# Patient Record
Sex: FEMALE | Race: OTHER | HISPANIC OR LATINO
[De-identification: names, ages, dates, MRNs, and addresses within clinical notes are randomized per-mention and may not be internally consistent; named-entity substitution may affect disease eponyms.]

---

## 2017-02-28 ENCOUNTER — APPOINTMENT (OUTPATIENT)
Dept: RADIOLOGY | Facility: IMAGING CENTER | Age: 67
End: 2017-02-28

## 2017-02-28 ENCOUNTER — OUTPATIENT (OUTPATIENT)
Dept: OUTPATIENT SERVICES | Facility: HOSPITAL | Age: 67
LOS: 1 days | End: 2017-02-28
Payer: COMMERCIAL

## 2017-02-28 ENCOUNTER — APPOINTMENT (OUTPATIENT)
Dept: MAMMOGRAPHY | Facility: IMAGING CENTER | Age: 67
End: 2017-02-28

## 2017-02-28 DIAGNOSIS — Z12.31 ENCOUNTER FOR SCREENING MAMMOGRAM FOR MALIGNANT NEOPLASM OF BREAST: ICD-10-CM

## 2017-02-28 DIAGNOSIS — Z13.820 ENCOUNTER FOR SCREENING FOR OSTEOPOROSIS: ICD-10-CM

## 2017-02-28 PROCEDURE — 77080 DXA BONE DENSITY AXIAL: CPT

## 2017-02-28 PROCEDURE — 77063 BREAST TOMOSYNTHESIS BI: CPT

## 2017-02-28 PROCEDURE — 77067 SCR MAMMO BI INCL CAD: CPT

## 2017-07-06 ENCOUNTER — RESULT REVIEW (OUTPATIENT)
Age: 67
End: 2017-07-06

## 2020-03-10 ENCOUNTER — APPOINTMENT (OUTPATIENT)
Dept: THORACIC SURGERY | Facility: CLINIC | Age: 70
End: 2020-03-10
Payer: COMMERCIAL

## 2020-03-10 VITALS
RESPIRATION RATE: 17 BRPM | SYSTOLIC BLOOD PRESSURE: 118 MMHG | BODY MASS INDEX: 26.13 KG/M2 | TEMPERATURE: 98.3 F | OXYGEN SATURATION: 97 % | HEART RATE: 100 BPM | WEIGHT: 142 LBS | HEIGHT: 62 IN | DIASTOLIC BLOOD PRESSURE: 78 MMHG

## 2020-03-10 PROCEDURE — 99205 OFFICE O/P NEW HI 60 MIN: CPT

## 2020-03-10 RX ORDER — CHLORHEXIDINE GLUCONATE 4 %
400 LIQUID (ML) TOPICAL
Refills: 0 | Status: ACTIVE | COMMUNITY

## 2020-03-10 RX ORDER — CETIRIZINE HCL 10 MG
10 TABLET ORAL
Refills: 0 | Status: ACTIVE | COMMUNITY

## 2020-03-10 RX ORDER — MELOXICAM 15 MG/1
15 TABLET ORAL
Refills: 0 | Status: ACTIVE | COMMUNITY

## 2020-03-10 RX ORDER — CLONAZEPAM 0.5 MG/1
0.5 TABLET ORAL
Refills: 0 | Status: ACTIVE | COMMUNITY

## 2020-03-13 NOTE — PHYSICAL EXAM
[General Appearance - Alert] : alert [General Appearance - In No Acute Distress] : in no acute distress [Sclera] : the sclera and conjunctiva were normal [PERRL With Normal Accommodation] : pupils were equal in size, round, and reactive to light [Extraocular Movements] : extraocular movements were intact [Outer Ear] : the ears and nose were normal in appearance [Oropharynx] : the oropharynx was normal [Neck Appearance] : the appearance of the neck was normal [Neck Cervical Mass (___cm)] : no neck mass was observed [Jugular Venous Distention Increased] : there was no jugular-venous distention [Thyroid Diffuse Enlargement] : the thyroid was not enlarged [Thyroid Nodule] : there were no palpable thyroid nodules [Auscultation Breath Sounds / Voice Sounds] : lungs were clear to auscultation bilaterally [Heart Rate And Rhythm] : heart rate was normal and rhythm regular [Heart Sounds] : normal S1 and S2 [Heart Sounds Gallop] : no gallops [Murmurs] : no murmurs [Heart Sounds Pericardial Friction Rub] : no pericardial rub [Examination Of The Chest] : the chest was normal in appearance [Chest Visual Inspection Thoracic Asymmetry] : no chest asymmetry [Diminished Respiratory Excursion] : normal chest expansion [2+] : left 2+ [No Abnormalities] : the abdominal aorta was not enlarged and no bruit was heard [Breast Appearance] : normal in appearance [Breast Palpation Mass] : no palpable masses [Bowel Sounds] : normal bowel sounds [Abdomen Soft] : soft [Abdomen Tenderness] : non-tender [Abdomen Mass (___ Cm)] : no abdominal mass palpated [Cervical Lymph Nodes Enlarged Posterior Bilaterally] : posterior cervical [Cervical Lymph Nodes Enlarged Anterior Bilaterally] : anterior cervical [Supraclavicular Lymph Nodes Enlarged Bilaterally] : supraclavicular [No CVA Tenderness] : no ~M costovertebral angle tenderness [No Spinal Tenderness] : no spinal tenderness [Skin Color & Pigmentation] : normal skin color and pigmentation [Skin Turgor] : normal skin turgor [] : no rash [Deep Tendon Reflexes (DTR)] : deep tendon reflexes were 2+ and symmetric [Sensation] : the sensory exam was normal to light touch and pinprick [No Focal Deficits] : no focal deficits [Oriented To Time, Place, And Person] : oriented to person, place, and time [Impaired Insight] : insight and judgment were intact [Affect] : the affect was normal [Right Carotid Bruit] : no bruit heard over the right carotid [Left Carotid Bruit] : no bruit heard over the left carotid [Right Femoral Bruit] : no bruit heard over the right femoral artery [Left Femoral Bruit] : no bruit heard over the left femoral artery [FreeTextEntry1] : Deferred

## 2020-03-13 NOTE — REASON FOR VISIT
[Consultation] : a consultation visit [Family Member] : family member [FreeTextEntry1] : Lung nodules

## 2020-03-13 NOTE — DATA REVIEWED
[FreeTextEntry1] : CT chest on 2020:\par - mild to moderate bilateral subpleural reticulations with mild associated groundglass attenuation and traction bronchiectasis within the lung bases consistent with interstitial lung disease, possibly UIP pattern. No honeycombing. No pleural effusion or pneumothorax\par - subcentimeter pulmonary nodules are noted includin mm RUL nodule (3: 41); 3 mm RML nodule (3: 76); 4 mm JENI nodule (3: 42); 2 mm LLL nodule (3: 108)

## 2020-03-13 NOTE — HISTORY OF PRESENT ILLNESS
[FreeTextEntry1] : Ms. HERNÁN MICHEL, 69 year old female, never smoker, w/ hx of DMII, HTN, HLD, Skin CA, who had bronchitis in 2019 in [place] s/p antibiotic and steroid, symptoms improved after treatment. Patient presented to Dr. Joann Concepcion c/o SOB, fatigue, dry cough. CT revealed lung nodules and interstitial lung disease. \par \par CT chest on 2020:\par - mild to moderate bilateral subpleural reticulations with mild associated groundglass attenuation and traction bronchiectasis within the lung bases consistent with interstitial lung disease, possibly UIP pattern. No honeycombing. No pleural effusion or pneumothorax\par - subcentimeter pulmonary nodules are noted includin mm RUL nodule (3: 41); 3 mm RML nodule (3: 76); 4 mm JENI nodule (3: 42); 2 mm LLL nodule (3: 108)\par \par Patient is here today for CT surgery consultation, self-refer. Patient c/o SOB on exertion, fatigue, dry cough, back pain, 8 lbs weight loss over a month, chills at night, denies chest pain, fever.

## 2020-03-13 NOTE — CONSULT LETTER
[Dear  ___] : Dear  [unfilled], [Consult Letter:] : I had the pleasure of evaluating your patient, [unfilled]. [( Thank you for referring [unfilled] for consultation for _____ )] : Thank you for referring [unfilled] for consultation for [unfilled] [Please see my note below.] : Please see my note below. [Consult Closing:] : Thank you very much for allowing me to participate in the care of this patient.  If you have any questions, please do not hesitate to contact me. [Sincerely,] : Sincerely, [FreeTextEntry2] : Dr. Joann Concepcion (Pulm)\par Dr. Akosua Concepcion (PCP) [FreeTextEntry3] : Jad Richardson MD, FACS \par Chief, Division of Thoracic Surgery \par Director, Minimally Invasive Thoracic Surgery \par Department of Cardiovascular and Thoracic Surgery \par [facility] \par , Cardiovascular and Thoracic Surgery\par \par

## 2020-03-13 NOTE — ASSESSMENT
[FreeTextEntry1] : Ms. HERNÁN MICHEL, 69 year old female, never smoker, w/ hx of DMII, HTN, HLD, Skin CA, who had bronchitis in 2019 in [place] s/p antibiotic and steroid, symptoms improved after treatment. Patient presented to Dr. Joann Concepcion c/o SOB, fatigue, dry cough. CT revealed lung nodules and interstitial lung disease. \par \par CT chest on 2020:\par - mild to moderate bilateral subpleural reticulations with mild associated groundglass attenuation and traction bronchiectasis within the lung bases consistent with interstitial lung disease, possibly UIP pattern. No honeycombing. No pleural effusion or pneumothorax\par - subcentimeter pulmonary nodules are noted includin mm RUL nodule (3: 41); 3 mm RML nodule (3: 76); 4 mm JENI nodule (3: 42); 2 mm LLL nodule (3: 108)\par \par I have reviewed the patient's medical records and diagnostic images at time of this office consultation and have made the following recommendation:\par 1. CT chest reviewed and explained to patient and her family member, I recommended patient to f/u with Dr. Joann Concepcion for interstitial lung disease. \par 2. Patient would like to have a second opinion, Dr. Alex Love (Pulm) information provided. \par 3. RTC as needed. \par \par I personally performed the services described in the documentation, reviewed the documentation recorded by the scribe in my presence and it accurately and completely records my words and actions.\par \par I, Kalani Ceron NP, am scribing for and the presence of KESHIA Kaur, the following sections HISTORY OF PRESENT ILLNESS, PAST MEDICAL/FAMILY/SOCIAL HISTORY; REVIEW OF SYSTEMS; VITAL SIGNS; PHYSICAL EXAM; DISPOSITION.

## 2020-03-17 ENCOUNTER — APPOINTMENT (OUTPATIENT)
Dept: PULMONOLOGY | Facility: CLINIC | Age: 70
End: 2020-03-17
Payer: COMMERCIAL

## 2020-03-17 VITALS
TEMPERATURE: 98.2 F | WEIGHT: 142 LBS | HEIGHT: 62 IN | OXYGEN SATURATION: 98 % | BODY MASS INDEX: 26.13 KG/M2 | HEART RATE: 102 BPM | SYSTOLIC BLOOD PRESSURE: 128 MMHG | RESPIRATION RATE: 16 BRPM | DIASTOLIC BLOOD PRESSURE: 76 MMHG

## 2020-03-17 DIAGNOSIS — M25.569 PAIN IN UNSPECIFIED KNEE: ICD-10-CM

## 2020-03-17 LAB
CK SERPL-CCNC: 60 U/L
RHEUMATOID FACT SER QL: <10 IU/ML

## 2020-03-17 PROCEDURE — 99203 OFFICE O/P NEW LOW 30 MIN: CPT

## 2020-03-17 RX ORDER — FLUTICASONE PROPIONATE 50 UG/1
50 SPRAY, METERED NASAL TWICE DAILY
Qty: 1 | Refills: 3 | Status: DISCONTINUED | COMMUNITY
Start: 2020-03-17 | End: 2020-03-17

## 2020-03-17 NOTE — ASSESSMENT
[FreeTextEntry1] : I reviewed the patient's CAT scan and there is evidence of early interstitial disease. There was no honeycombing. Her lung labs look preserved. Unfortunately we cannot perform a lung function studies or walk test because of COVI-19 pandemic. Blood work for interstitial lung disease was drawn. I prescribed nasal fluticasone for her cough. Hopefully lung functions can be performed in 2-3 weeks with a 6 minute walk test.

## 2020-03-17 NOTE — HISTORY OF PRESENT ILLNESS
[TextBox_4] : po lerma    \par the patient is a 72-year-old female with cough and dyspnea. The patient is Greek speaking from [place] and the  was her daughter. The patient has complained of increasing dyspnea over the last year. She is also complaining of a continuous dry and nonproductive cough she went to her primary care doctor and eventually a CAT scan was done which showed mild to moderate interstitial lung disease the pattern not typical for usual interstitial pneumonitis. The patient has long-standing history of arthritis of her knees and back but she denies any new onset arthritis, skin eruptions or blurry vision. She also denies dysphagia. She has long-standing diabetes and hypertension. She denies any smoking history or childhood pulmonary diseases.

## 2020-03-17 NOTE — REVIEW OF SYSTEMS
[Fatigue] : fatigue [Cough] : cough [SOB on Exertion] : sob on exertion [Arthralgias] : arthralgias [Negative] : Neurologic [TextBox_44] : hypertension

## 2020-03-17 NOTE — PHYSICAL EXAM
[No Acute Distress] : no acute distress [Normal Oropharynx] : normal oropharynx [Normal Appearance] : normal appearance [No Neck Mass] : no neck mass [Normal Rate/Rhythm] : normal rate/rhythm [Normal S1, S2] : normal s1, s2 [No Clubbing] : no clubbing [No Edema] : no edema [Normal Color/ Pigmentation] : normal color/ pigmentation [No Focal Deficits] : no focal deficits [TextBox_68] : crackles both lung bases [TextBox_99] : waddling gait

## 2020-03-18 LAB
ANA SER IF-ACNC: NEGATIVE
CCP AB SER IA-ACNC: <8 UNITS
CENTROMERE IGG SER-ACNC: <0.2 CD:130001892
DSDNA AB SER-ACNC: <12 IU/ML
ENA JO1 AB SER IA-ACNC: <0.2 AL
ENA SCL70 IGG SER IA-ACNC: <0.2 AL
ENA SS-A AB SER IA-ACNC: <0.2 AL
ENA SS-B AB SER IA-ACNC: <0.2 AL
MPO AB + PR3 PNL SER: NORMAL
RF+CCP IGG SER-IMP: NEGATIVE
RIBOSOMAL P AB SER IA-ACNC: <0.2 AL

## 2020-03-19 LAB
ANCA AB SER-IMP: NEGATIVE
C-ANCA SER-ACNC: NEGATIVE
MYELOPEROXIDASE AB SER QL IA: <5 UNITS
MYELOPEROXIDASE CELLS FLD QL: NEGATIVE
P-ANCA TITR SER IF: NEGATIVE

## 2020-03-20 LAB
SMOOTH MUSCLE AB SER QL IF: NORMAL
STREP DNASE B TITR SER: < 95 U/ML

## 2020-03-23 LAB — RNA POLYMERASE III IGG: 3 UNITS

## 2020-03-26 ENCOUNTER — NON-APPOINTMENT (OUTPATIENT)
Age: 70
End: 2020-03-26

## 2020-04-07 ENCOUNTER — APPOINTMENT (OUTPATIENT)
Dept: PULMONOLOGY | Facility: CLINIC | Age: 70
End: 2020-04-07

## 2020-04-08 ENCOUNTER — RX CHANGE (OUTPATIENT)
Age: 70
End: 2020-04-08

## 2020-04-08 RX ORDER — FLUTICASONE PROPIONATE 50 UG/1
50 SPRAY, METERED NASAL TWICE DAILY
Qty: 48 | Refills: 3 | Status: ACTIVE | COMMUNITY
Start: 2020-03-17 | End: 1900-01-01

## 2020-04-28 ENCOUNTER — APPOINTMENT (OUTPATIENT)
Dept: PULMONOLOGY | Facility: CLINIC | Age: 70
End: 2020-04-28

## 2020-06-04 ENCOUNTER — APPOINTMENT (OUTPATIENT)
Dept: ORTHOPEDIC SURGERY | Facility: CLINIC | Age: 70
End: 2020-06-04
Payer: COMMERCIAL

## 2020-06-04 VITALS
DIASTOLIC BLOOD PRESSURE: 83 MMHG | SYSTOLIC BLOOD PRESSURE: 136 MMHG | BODY MASS INDEX: 26.13 KG/M2 | HEART RATE: 97 BPM | WEIGHT: 142 LBS | TEMPERATURE: 98.1 F | HEIGHT: 62 IN

## 2020-06-04 PROCEDURE — 73030 X-RAY EXAM OF SHOULDER: CPT | Mod: RT

## 2020-06-04 PROCEDURE — 99204 OFFICE O/P NEW MOD 45 MIN: CPT | Mod: 25

## 2020-06-04 PROCEDURE — 20610 DRAIN/INJ JOINT/BURSA W/O US: CPT | Mod: RT

## 2020-08-27 DIAGNOSIS — Z01.818 ENCOUNTER FOR OTHER PREPROCEDURAL EXAMINATION: ICD-10-CM

## 2020-08-28 ENCOUNTER — APPOINTMENT (OUTPATIENT)
Dept: PULMONOLOGY | Facility: CLINIC | Age: 70
End: 2020-08-28
Payer: COMMERCIAL

## 2020-08-28 ENCOUNTER — APPOINTMENT (OUTPATIENT)
Dept: DISASTER EMERGENCY | Facility: CLINIC | Age: 70
End: 2020-08-28

## 2020-08-28 PROCEDURE — 94729 DIFFUSING CAPACITY: CPT

## 2020-08-28 PROCEDURE — 94726 PLETHYSMOGRAPHY LUNG VOLUMES: CPT

## 2020-08-28 PROCEDURE — 94010 BREATHING CAPACITY TEST: CPT

## 2020-08-31 ENCOUNTER — APPOINTMENT (OUTPATIENT)
Dept: PULMONOLOGY | Facility: CLINIC | Age: 70
End: 2020-08-31

## 2021-03-02 ENCOUNTER — APPOINTMENT (OUTPATIENT)
Dept: ORTHOPEDIC SURGERY | Facility: CLINIC | Age: 71
End: 2021-03-02
Payer: COMMERCIAL

## 2021-03-02 VITALS — HEIGHT: 62 IN | WEIGHT: 142 LBS | BODY MASS INDEX: 26.13 KG/M2 | TEMPERATURE: 97.6 F

## 2021-03-02 DIAGNOSIS — M75.01 ADHESIVE CAPSULITIS OF RIGHT SHOULDER: ICD-10-CM

## 2021-03-02 DIAGNOSIS — M75.41 IMPINGEMENT SYNDROME OF RIGHT SHOULDER: ICD-10-CM

## 2021-03-02 DIAGNOSIS — M19.90 UNSPECIFIED OSTEOARTHRITIS, UNSPECIFIED SITE: ICD-10-CM

## 2021-03-02 PROCEDURE — 99072 ADDL SUPL MATRL&STAF TM PHE: CPT

## 2021-03-02 PROCEDURE — 99213 OFFICE O/P EST LOW 20 MIN: CPT

## 2021-03-08 ENCOUNTER — APPOINTMENT (OUTPATIENT)
Dept: ORTHOPEDIC SURGERY | Facility: CLINIC | Age: 71
End: 2021-03-08

## 2021-08-18 ENCOUNTER — APPOINTMENT (OUTPATIENT)
Dept: UROLOGY | Facility: CLINIC | Age: 71
End: 2021-08-18
Payer: COMMERCIAL

## 2021-08-18 VITALS
RESPIRATION RATE: 16 BRPM | BODY MASS INDEX: 25.76 KG/M2 | HEART RATE: 96 BPM | OXYGEN SATURATION: 97 % | HEIGHT: 62 IN | SYSTOLIC BLOOD PRESSURE: 126 MMHG | DIASTOLIC BLOOD PRESSURE: 77 MMHG | WEIGHT: 140 LBS

## 2021-08-18 DIAGNOSIS — N95.2 POSTMENOPAUSAL ATROPHIC VAGINITIS: ICD-10-CM

## 2021-08-18 PROCEDURE — 99204 OFFICE O/P NEW MOD 45 MIN: CPT

## 2021-08-18 RX ORDER — MECLIZINE HYDROCHLORIDE 25 MG/1
25 TABLET ORAL
Refills: 0 | Status: ACTIVE | COMMUNITY

## 2021-08-18 RX ORDER — DULAGLUTIDE 1.5 MG/.5ML
1.5 INJECTION, SOLUTION SUBCUTANEOUS
Refills: 0 | Status: ACTIVE | COMMUNITY

## 2021-08-18 RX ORDER — NITROFURANTOIN MACROCRYSTALS 50 MG/1
50 CAPSULE ORAL
Qty: 30 | Refills: 3 | Status: ACTIVE | COMMUNITY
Start: 2021-08-18 | End: 1900-01-01

## 2021-08-18 RX ORDER — CHOLECALCIFEROL (VITAMIN D3) 25 MCG
TABLET ORAL
Refills: 0 | Status: ACTIVE | COMMUNITY

## 2021-08-18 RX ORDER — QUETIAPINE 150 MG/1
150 TABLET, FILM COATED, EXTENDED RELEASE ORAL
Refills: 0 | Status: ACTIVE | COMMUNITY

## 2021-08-18 RX ORDER — PRAMIPEXOLE DIHYDROCHLORIDE 0.25 MG/1
0.25 TABLET ORAL
Refills: 0 | Status: ACTIVE | COMMUNITY

## 2021-08-18 NOTE — REVIEW OF SYSTEMS
[Abdominal Pain] : abdominal pain [Vomiting] : vomiting [Diarrhea] : diarrhea [Heartburn] : heartburn [Genital bacterial infection] : genital bacterial infection [Urine Infection (bladder/kidney)] : bladder/kidney infection [Dizziness] : dizziness [Negative] : Heme/Lymph

## 2021-08-22 PROBLEM — N95.2 VAGINAL ATROPHY: Status: ACTIVE | Noted: 2021-08-18

## 2021-08-22 NOTE — PHYSICAL EXAM
[General Appearance - Well Developed] : well developed [General Appearance - Well Nourished] : well nourished [Normal Appearance] : normal appearance [Well Groomed] : well groomed [General Appearance - In No Acute Distress] : no acute distress [Edema] : no peripheral edema [Respiration, Rhythm And Depth] : normal respiratory rhythm and effort [Exaggerated Use Of Accessory Muscles For Inspiration] : no accessory muscle use [Abdomen Soft] : soft [Abdomen Tenderness] : non-tender [Costovertebral Angle Tenderness] : no ~M costovertebral angle tenderness [Urethral Meatus] : normal urethra [Urinary Bladder Findings] : the bladder was normal on palpation [External Female Genitalia] : normal external genitalia [Normal Station and Gait] : the gait and station were normal for the patient's age [] : no rash [No Focal Deficits] : no focal deficits [Oriented To Time, Place, And Person] : oriented to person, place, and time [Affect] : the affect was normal [Mood] : the mood was normal [Not Anxious] : not anxious [No Palpable Adenopathy] : no palpable adenopathy [FreeTextEntry1] : vaginal atrophy

## 2021-08-22 NOTE — ASSESSMENT
[FreeTextEntry1] : 70 yo F with recurrent UTI\par \par - PVR = 10ml\par - discussed possible etiologies for UTI. Spent extensive period of time discussed behavioral modification including adequate hydration, cutting back on caffeine intake, timed voiding during the day, importance of controlling any diabetes and chronic constipation and how all of these things could potentially increase risk for persistent or recurrent UTI.\par - UA, culture\par - Renal US\par - Discussed pros and cons of nitrofurantoin PRN intercourse. Rx transmitted\par - Discussed estrace for vaginal atrophy. Rx transmitted and side effect profile reviewed

## 2021-08-22 NOTE — HISTORY OF PRESENT ILLNESS
[FreeTextEntry1] : 70 yo Swedish speaking F presents with frequent UTI history\par Per pt, UTI 30 yrs ago requiring hospitalization for 4-5 days\par Since then, has been getting recurrent UTI - 2/year\par Recently had UTI requiring 3 course of abx\par symptoms - chills, nausea, dizziness\par Some mild dysuria but no significant LUTS\par Voids 5-6/day, no nocturia\par No incontinence\par No gross hematuria\par Drinks 3-4 cups of water, 1 cup of coffee daily\par normal bowel movements\par 3 children, '\par LMP = 20 yrs ago\par sometimes sexually active - some dyspareunia, symptoms seem to occur after intercourse

## 2021-08-23 ENCOUNTER — RX CHANGE (OUTPATIENT)
Age: 71
End: 2021-08-23

## 2021-08-23 ENCOUNTER — NON-APPOINTMENT (OUTPATIENT)
Age: 71
End: 2021-08-23

## 2021-08-23 LAB
APPEARANCE: CLEAR
BACTERIA UR CULT: ABNORMAL
BACTERIA: ABNORMAL
BILIRUBIN URINE: NEGATIVE
BLOOD URINE: NEGATIVE
COLOR: NORMAL
GLUCOSE QUALITATIVE U: NEGATIVE
HYALINE CASTS: 0 /LPF
KETONES URINE: NEGATIVE
LEUKOCYTE ESTERASE URINE: ABNORMAL
MICROSCOPIC-UA: NORMAL
NITRITE URINE: POSITIVE
PH URINE: 5.5
PROTEIN URINE: NEGATIVE
RED BLOOD CELLS URINE: 1 /HPF
SPECIFIC GRAVITY URINE: 1.01
SQUAMOUS EPITHELIAL CELLS: 1 /HPF
UROBILINOGEN URINE: NORMAL
WHITE BLOOD CELLS URINE: 8 /HPF

## 2021-08-23 RX ORDER — SULFAMETHOXAZOLE AND TRIMETHOPRIM 800; 160 MG/1; MG/1
800-160 TABLET ORAL TWICE DAILY
Qty: 14 | Refills: 0 | Status: DISCONTINUED | COMMUNITY
Start: 2021-08-23 | End: 2021-08-23

## 2021-08-23 RX ORDER — CEFDINIR 300 MG/1
300 CAPSULE ORAL
Qty: 14 | Refills: 0 | Status: ACTIVE | COMMUNITY
Start: 2021-08-23 | End: 1900-01-01

## 2021-08-24 ENCOUNTER — NON-APPOINTMENT (OUTPATIENT)
Age: 71
End: 2021-08-24

## 2021-08-25 ENCOUNTER — RX CHANGE (OUTPATIENT)
Age: 71
End: 2021-08-25

## 2021-08-25 ENCOUNTER — APPOINTMENT (OUTPATIENT)
Dept: UROLOGY | Facility: CLINIC | Age: 71
End: 2021-08-25
Payer: COMMERCIAL

## 2021-08-25 DIAGNOSIS — N39.0 URINARY TRACT INFECTION, SITE NOT SPECIFIED: ICD-10-CM

## 2021-08-25 PROCEDURE — 76705 ECHO EXAM OF ABDOMEN: CPT

## 2021-08-26 PROBLEM — N39.0 RECURRENT UTI: Status: ACTIVE | Noted: 2021-08-18

## 2021-12-03 ENCOUNTER — APPOINTMENT (OUTPATIENT)
Age: 71
End: 2021-12-03

## 2022-03-29 ENCOUNTER — APPOINTMENT (OUTPATIENT)
Dept: PULMONOLOGY | Facility: CLINIC | Age: 72
End: 2022-03-29
Payer: COMMERCIAL

## 2022-03-29 VITALS
HEIGHT: 62 IN | DIASTOLIC BLOOD PRESSURE: 82 MMHG | SYSTOLIC BLOOD PRESSURE: 127 MMHG | HEART RATE: 82 BPM | WEIGHT: 144 LBS | RESPIRATION RATE: 16 BRPM | TEMPERATURE: 97.8 F | BODY MASS INDEX: 26.5 KG/M2

## 2022-03-29 PROCEDURE — 99214 OFFICE O/P EST MOD 30 MIN: CPT

## 2022-03-29 NOTE — PHYSICAL EXAM
[No Acute Distress] : no acute distress [Normal Oropharynx] : normal oropharynx [Normal Appearance] : normal appearance [No Neck Mass] : no neck mass [Normal Rate/Rhythm] : normal rate/rhythm [Normal S1, S2] : normal s1, s2 [No Resp Distress] : no resp distress [Clear to Auscultation Bilaterally] : clear to auscultation bilaterally [Normal Gait] : normal gait [No Clubbing] : no clubbing [No Edema] : no edema

## 2022-03-30 RX ORDER — DOXYCYCLINE HYCLATE 100 MG/1
100 TABLET ORAL TWICE DAILY
Qty: 10 | Refills: 0 | Status: ACTIVE | COMMUNITY
Start: 2022-03-30 | End: 1900-01-01

## 2022-04-01 NOTE — END OF VISIT
[Time Spent: ___ minutes] : I have spent [unfilled] minutes of time on the encounter. [FreeTextEntry3] : I spent a total of 30 minutes on this patient encounter including face-to-face time, review of imaging from 20/20 and 2022, review of data that she brought with her and a discussion with her daughter on the phone.

## 2022-04-01 NOTE — ASSESSMENT
[FreeTextEntry1] : I reviewed her CAT scans from 20/20 on from 2022 and I reviewed her lung functions that she brought with her. The cough has upper airway cough characteristics. I first tried nasal fluticasone to be used for the next 4 weeks. I've also prescribed doxycycline and I demonstrated to her how to use her albuterol inhaler. She was using it incorrectly. I gave her an AeroChamber. She and her  are flying to [place] tomorrow and I suggested Afrin nasal spray before the flight.\par \par Notes sent to Nicholas Concepcion MD

## 2022-04-01 NOTE — HISTORY OF PRESENT ILLNESS
[TextBox_4] : 71-year-old portugese speaking female with her  and her daughter woke on the phone. I had seen her 2 years ago because of the possibility of progressive pulmonary fibrosis. She reports that she is not any more dyspneic than she was 2 years ago. She brought in copies of lung functions which showed mild restrictive ventilatory impairment with a mild reduction in her diffusing capacity and she brought in with her CAT scan CD demonstrating stable pulmonary fibrosis. Her major complaint is cough that is present day and night and not triggered by anything. Except perhaps physical activity. She denies any increase in cough with positional changes, time of day, eating or drinking. She is hypertensive but she is not on an ACE inhibitor. She is a nonsmoker without occupational exposures. She denies any reflux symptoms

## 2022-04-08 ENCOUNTER — APPOINTMENT (OUTPATIENT)
Dept: PULMONOLOGY | Facility: CLINIC | Age: 72
End: 2022-04-08
Payer: COMMERCIAL

## 2022-04-08 PROCEDURE — 99213 OFFICE O/P EST LOW 20 MIN: CPT | Mod: 95

## 2022-04-08 RX ORDER — AZELASTINE HYDROCHLORIDE 205.5 UG/1
0.15 SPRAY, METERED NASAL
Qty: 1 | Refills: 6 | Status: ACTIVE | COMMUNITY
Start: 2022-04-08 | End: 1900-01-01

## 2022-04-08 NOTE — ASSESSMENT
[FreeTextEntry1] : the etiology of the cough is unclear. He continues to have upper airway characteristics and so I have added Astelin to the Flonase and prescribed a Hycodan generic.. She is returning to [place] next week and if the cough persists beyond another 2 weeks, I will need to investigate further

## 2022-04-08 NOTE — HISTORY OF PRESENT ILLNESS
[TextBox_4] : the patient is 72 years old and is Chadian speaking. Daughter acted as  . Despite using the Flonase regularly, the patient's cough persists. It is similar characteristics of what I described 2 weeks ago. There does not appear to be any triggers. She is not on any new medication. She denies any aspiration issues. She also denies any recent respiratory infections. Her images that she brought with her on last visit were unchanged as were her lung functions. The cough continues to have what seems to be upper airway characteristics

## 2022-04-08 NOTE — REASON FOR VISIT
[Other Location: e.g. School (Enter Location, City,State)___] : at [unfilled], at the time of the visit. [Medical Office: ([facility])___] : at the medical office located in  [Family Member] : family member [Cough] : cough [FreeTextEntry3] : daughter

## 2022-04-26 ENCOUNTER — APPOINTMENT (OUTPATIENT)
Dept: PULMONOLOGY | Facility: CLINIC | Age: 72
End: 2022-04-26
Payer: COMMERCIAL

## 2022-04-26 VITALS
HEART RATE: 92 BPM | DIASTOLIC BLOOD PRESSURE: 72 MMHG | WEIGHT: 140 LBS | TEMPERATURE: 98 F | BODY MASS INDEX: 25.76 KG/M2 | OXYGEN SATURATION: 96 % | HEIGHT: 62 IN | SYSTOLIC BLOOD PRESSURE: 131 MMHG

## 2022-04-26 DIAGNOSIS — R05.8 OTHER SPECIFIED COUGH: ICD-10-CM

## 2022-04-26 DIAGNOSIS — J84.9 INTERSTITIAL PULMONARY DISEASE, UNSPECIFIED: ICD-10-CM

## 2022-04-26 PROCEDURE — 99213 OFFICE O/P EST LOW 20 MIN: CPT | Mod: GC

## 2022-04-26 RX ORDER — HYDROCODONE BITARTRATE AND HOMATROPINE METHYLBROMIDE 1.5; 5 MG/5ML; MG/5ML
5-1.5 SOLUTION ORAL
Qty: 100 | Refills: 0 | Status: ACTIVE | COMMUNITY
Start: 2022-04-08 | End: 1900-01-01

## 2022-04-26 RX ORDER — FLUTICASONE PROPIONATE 50 UG/1
50 SPRAY, METERED NASAL TWICE DAILY
Qty: 1 | Refills: 3 | Status: ACTIVE | COMMUNITY
Start: 2022-03-29 | End: 1900-01-01

## 2022-04-26 NOTE — REVIEW OF SYSTEMS
[Cough] : cough [Negative] : Endocrine [Fever] : no fever [Chills] : no chills [Hemoptysis] : no hemoptysis [Sputum] : no sputum [Dyspnea] : no dyspnea

## 2022-04-26 NOTE — ASSESSMENT
[FreeTextEntry1] : 72 year-old F with PMH pulmonary fibrosis who presented to the clinic for evaluation of chronic cough, appears to have upper respiratory features.\par \par Cough\par - Patient is planned for EGD tomorrow, follow up results\par - Continue with Flonase and Azelastine, instructed patient and daughter on proper technique and frequency of use\par - Refill cough suppressant, Hydrocodone oral solution\par - CT Sinus ordered for evaluation. May require ENT evaluation pending results\par - Advised to stop benzonatate and Breo\par \par Fibrosis\par - C/w regular follow up\par \par Follow up to be determined after CT.\par \par Case seen and discussed with Dr. Love\par \par Kyrie Peralta, PGY-6\par Pulmonary and Critical Care Medicine

## 2022-04-26 NOTE — PHYSICAL EXAM
[No Acute Distress] : no acute distress [Normal Oropharynx] : normal oropharynx [Normal Appearance] : normal appearance [No Neck Mass] : no neck mass [Normal Rate/Rhythm] : normal rate/rhythm [Normal S1, S2] : normal s1, s2 [No Murmurs] : no murmurs [No Resp Distress] : no resp distress [No Abnormalities] : no abnormalities [Benign] : benign [Normal Gait] : normal gait [No Clubbing] : no clubbing [No Edema] : no edema [Normal Color/ Pigmentation] : normal color/ pigmentation [No Focal Deficits] : no focal deficits [Oriented x3] : oriented x3 [Normal Affect] : normal affect [TextBox_68] : Soft crackles at bases, coughing

## 2022-04-26 NOTE — HISTORY OF PRESENT ILLNESS
[TextBox_4] : 72 year-old F with PMH pulmonary fibrosis who presented to the clinic for follow up of a chronic cough. Azelastine was added at the last visit. She recently saw a GI and an allergist. She was given benzonatate and scheduled for an EGD. She was also given a Breo inhaler by the allergist. She has been taking the Flonase and Azelastine, which she is not sure if it helped. The Hydrocodone oral solution did help but the cough came back after she ran out of this. She endorses getting headaches throughout the day from coughing. She reports her cough is worse in the morning. She tends to cough more when exerting herself and believes it is worse when outside.\par \par PCP: Dr. Nicholas Concepcion

## 2022-05-18 ENCOUNTER — NON-APPOINTMENT (OUTPATIENT)
Age: 72
End: 2022-05-18

## 2022-05-24 ENCOUNTER — NON-APPOINTMENT (OUTPATIENT)
Age: 72
End: 2022-05-24

## 2022-05-25 ENCOUNTER — APPOINTMENT (OUTPATIENT)
Dept: CARDIOLOGY | Facility: CLINIC | Age: 72
End: 2022-05-25
Payer: COMMERCIAL

## 2022-05-25 ENCOUNTER — NON-APPOINTMENT (OUTPATIENT)
Age: 72
End: 2022-05-25

## 2022-05-25 VITALS
SYSTOLIC BLOOD PRESSURE: 132 MMHG | HEIGHT: 62 IN | HEART RATE: 92 BPM | OXYGEN SATURATION: 98 % | BODY MASS INDEX: 26.13 KG/M2 | DIASTOLIC BLOOD PRESSURE: 72 MMHG | WEIGHT: 142 LBS

## 2022-05-25 DIAGNOSIS — Z82.49 FAMILY HISTORY OF ISCHEMIC HEART DISEASE AND OTHER DISEASES OF THE CIRCULATORY SYSTEM: ICD-10-CM

## 2022-05-25 DIAGNOSIS — R06.02 SHORTNESS OF BREATH: ICD-10-CM

## 2022-05-25 DIAGNOSIS — Z86.59 PERSONAL HISTORY OF OTHER MENTAL AND BEHAVIORAL DISORDERS: ICD-10-CM

## 2022-05-25 DIAGNOSIS — E11.9 TYPE 2 DIABETES MELLITUS W/OUT COMPLICATIONS: ICD-10-CM

## 2022-05-25 PROCEDURE — 93000 ELECTROCARDIOGRAM COMPLETE: CPT

## 2022-05-25 PROCEDURE — 93306 TTE W/DOPPLER COMPLETE: CPT

## 2022-05-25 PROCEDURE — 99204 OFFICE O/P NEW MOD 45 MIN: CPT | Mod: 25

## 2022-05-25 RX ORDER — ESTRADIOL 0.1 MG/G
0.1 CREAM VAGINAL
Qty: 1 | Refills: 5 | Status: DISCONTINUED | COMMUNITY
Start: 2021-08-18 | End: 2022-05-25

## 2022-05-25 RX ORDER — ASPIRIN 81 MG
81 TABLET, DELAYED RELEASE (ENTERIC COATED) ORAL DAILY
Refills: 0 | Status: ACTIVE | COMMUNITY

## 2022-05-25 RX ORDER — ATORVASTATIN CALCIUM 10 MG/1
10 TABLET, FILM COATED ORAL AT BEDTIME
Refills: 0 | Status: ACTIVE | COMMUNITY

## 2022-05-25 RX ORDER — SULFAMETHOXAZOLE AND TRIMETHOPRIM 800; 160 MG/1; MG/1
800-160 TABLET ORAL
Qty: 14 | Refills: 0 | Status: DISCONTINUED | COMMUNITY
Start: 2021-08-23 | End: 2022-05-25

## 2022-05-25 RX ORDER — DICYCLOMINE HYDROCHLORIDE 10 MG/1
10 CAPSULE ORAL
Refills: 0 | Status: DISCONTINUED | COMMUNITY
End: 2022-05-25

## 2022-05-25 RX ORDER — NITROFURANTOIN (MONOHYDRATE/MACROCRYSTALS) 25; 75 MG/1; MG/1
100 CAPSULE ORAL
Refills: 0 | Status: DISCONTINUED | COMMUNITY
End: 2022-05-25

## 2022-05-25 RX ORDER — SERTRALINE HYDROCHLORIDE 100 MG/1
100 TABLET, FILM COATED ORAL
Refills: 0 | Status: ACTIVE | COMMUNITY

## 2022-05-25 RX ORDER — OLMESARTAN MEDOXOMIL AND HYDROCHLOROTHIAZIDE 40; 25 MG/1; MG/1
TABLET ORAL
Refills: 0 | Status: DISCONTINUED | COMMUNITY
End: 2022-05-25

## 2022-05-25 NOTE — HISTORY OF PRESENT ILLNESS
[FreeTextEntry1] : Arelis De La Cruz is a 72 year old Angolan speaking female ( translated by daughter Ms. Barahona ) with history of Hypertension, hypercholesterolemia , diabetes and Anxiety comes for cardiac evaluation. Denies any chest pain or palpitations. Mild shortness of breath on exertion which is relieved with rest in few minutes and chronic. Said she had Echo and stress test last year by different Cardiologist. Complaining of dry cough started 3 months ago. Was seen by Pulmonary, GI and Allergy specialist. Etiology for Cough could not be identified. Has mild Pulmonary fibrosis and was told it is stable. Used Steroids in the beginning which temporarily worked. Eats late dinner. Physically not active. Compliant to medications and diet. There is no relation of starting any new medication and start of cough.

## 2022-05-25 NOTE — DISCUSSION/SUMMARY
[FreeTextEntry1] : In a summary Arelis De La Cruz is an elderly female with Hypertension, controlled. Continue Olmesartan and 2 gm sodium diet. Hypercholesterolemia, continue Atorvastatin and low cholesterol diet. LDL goal less than 70 g/dL. Diabetes, on medications and low Carb diet. Shortness of breath on exertion and risk factors, Echo done showed normal LV systolic function and wall motion. Daughter said she will get Stress test report from previous Cardiologist. Chronic dry cough, explained her to try Allegra to see if it improves. Olmesartan does not cause cough. If cough does not improve we can try stopping Olmesartan to see if it stops cough. Discussed with daughter Ms. Barahona in detail.  Eat early dinner. Increase physical activity.

## 2022-05-25 NOTE — REVIEW OF SYSTEMS
[Dyspnea on exertion] : dyspnea during exertion [Cough] : cough [Anxiety] : anxiety [Negative] : Constitutional [Blurry Vision] : no blurred vision [Chest Discomfort] : no chest discomfort [Lower Ext Edema] : no extremity edema [Palpitations] : no palpitations [Orthopnea] : no orthopnea [PND] : no PND [Wheezing] : no wheezing [Abdominal Pain] : no abdominal pain [Nausea] : no nausea [Vomiting] : no vomiting [Heartburn] : no heartburn [Joint Pain] : no joint pain [Rash] : no rash [Itching] : no itching [Dizziness] : no dizziness [Tremor] : no tremor was seen [Numbness (Hypoesthesia)] : no numbness [Tingling (Paresthesia)] : no tingling [Confusion] : no confusion was observed [Under Stress] : not under stress [Easy Bleeding] : no tendency for easy bleeding [Easy Bruising] : no tendency for easy bruising

## 2022-06-30 ENCOUNTER — APPOINTMENT (OUTPATIENT)
Dept: CARDIOLOGY | Facility: CLINIC | Age: 72
End: 2022-06-30
Payer: COMMERCIAL

## 2022-06-30 VITALS
DIASTOLIC BLOOD PRESSURE: 66 MMHG | HEART RATE: 96 BPM | OXYGEN SATURATION: 98 % | BODY MASS INDEX: 24.66 KG/M2 | TEMPERATURE: 98 F | WEIGHT: 134 LBS | HEIGHT: 62 IN | SYSTOLIC BLOOD PRESSURE: 106 MMHG

## 2022-06-30 PROCEDURE — 99214 OFFICE O/P EST MOD 30 MIN: CPT

## 2022-06-30 RX ORDER — OLMESARTAN MEDOXOMIL 40 MG/1
40 TABLET, FILM COATED ORAL DAILY
Refills: 0 | Status: DISCONTINUED | COMMUNITY
End: 2022-06-30

## 2022-06-30 RX ORDER — AMLODIPINE BESYLATE 5 MG/1
5 TABLET ORAL DAILY
Qty: 30 | Refills: 3 | Status: ACTIVE | COMMUNITY
Start: 2022-06-30 | End: 1900-01-01

## 2022-06-30 NOTE — REVIEW OF SYSTEMS
[Cough] : cough [Anxiety] : anxiety [Negative] : Constitutional [Blurry Vision] : no blurred vision [Dyspnea on exertion] : not dyspnea during exertion [Chest Discomfort] : no chest discomfort [Lower Ext Edema] : no extremity edema [Palpitations] : no palpitations [Orthopnea] : no orthopnea [PND] : no PND [Wheezing] : no wheezing [Abdominal Pain] : no abdominal pain [Nausea] : no nausea [Vomiting] : no vomiting [Heartburn] : no heartburn [Joint Pain] : no joint pain [Rash] : no rash [Itching] : no itching [Dizziness] : no dizziness [Tremor] : no tremor was seen [Numbness (Hypoesthesia)] : no numbness [Tingling (Paresthesia)] : no tingling [Confusion] : no confusion was observed [Under Stress] : not under stress [Easy Bleeding] : no tendency for easy bleeding [Easy Bruising] : no tendency for easy bruising

## 2022-06-30 NOTE — HISTORY OF PRESENT ILLNESS
[FreeTextEntry1] : Arelis De La Cruz is a 72 year old Northern Irish speaking female ( translated by daughter Ms. Barahona ) with history of Hypertension, hypercholesterolemia , diabetes and Anxiety comes for follow up visit. Denies any chest pain or palpitations. No shortness of breath on exertion . Complaining of dry cough started 3 months ago. Was seen by Pulmonary, GI and Allergy specialist. Etiology for Cough could not be identified. Has mild Pulmonary fibrosis and was told it is stable. Seen by ENT recently and work up was negative. Physically not active. Compliant to medications and diet. Cough started couple of months after starting Olmesartan.

## 2022-06-30 NOTE — DISCUSSION/SUMMARY
[FreeTextEntry1] : In a summary Arelis De La Cruz is an elderly female with Hypertension, controlled. Discontinued Olmesartan as cough may be secondary to Olmesartan. Started on Amlodipine 5 mg once daily and 2 gm sodium diet. Side effects of Amlodipine explained. Hypercholesterolemia, continue Atorvastatin and low cholesterol diet. LDL goal less than 70 g/dL. Diabetes, on medications and low Carb diet.   Eat early dinner. Increase physical activity. Follow up in 3 months.

## 2022-07-20 ENCOUNTER — APPOINTMENT (OUTPATIENT)
Dept: SPEECH THERAPY | Facility: HOSPITAL | Age: 72
End: 2022-07-20
Payer: COMMERCIAL

## 2022-07-20 ENCOUNTER — APPOINTMENT (OUTPATIENT)
Dept: RADIOLOGY | Facility: HOSPITAL | Age: 72
End: 2022-07-20
Payer: COMMERCIAL

## 2022-07-20 ENCOUNTER — OUTPATIENT (OUTPATIENT)
Dept: OUTPATIENT SERVICES | Facility: HOSPITAL | Age: 72
LOS: 1 days | End: 2022-07-20

## 2022-07-20 ENCOUNTER — OUTPATIENT (OUTPATIENT)
Dept: OUTPATIENT SERVICES | Facility: HOSPITAL | Age: 72
LOS: 1 days | Discharge: HOME | End: 2022-07-20

## 2022-07-20 DIAGNOSIS — J31.0 CHRONIC RHINITIS: ICD-10-CM

## 2022-07-20 DIAGNOSIS — R05.3 CHRONIC COUGH: ICD-10-CM

## 2022-07-20 DIAGNOSIS — K21.9 GASTRO-ESOPHAGEAL REFLUX DISEASE WITHOUT ESOPHAGITIS: ICD-10-CM

## 2022-07-20 PROCEDURE — 74230 X-RAY XM SWLNG FUNCJ C+: CPT | Mod: 26

## 2022-07-21 ENCOUNTER — APPOINTMENT (OUTPATIENT)
Dept: CARDIOLOGY | Facility: CLINIC | Age: 72
End: 2022-07-21
Payer: COMMERCIAL

## 2022-07-21 VITALS — DIASTOLIC BLOOD PRESSURE: 68 MMHG | SYSTOLIC BLOOD PRESSURE: 132 MMHG

## 2022-07-21 VITALS
HEIGHT: 62 IN | BODY MASS INDEX: 24.66 KG/M2 | HEART RATE: 103 BPM | DIASTOLIC BLOOD PRESSURE: 74 MMHG | SYSTOLIC BLOOD PRESSURE: 150 MMHG | WEIGHT: 134 LBS | OXYGEN SATURATION: 97 %

## 2022-07-21 VITALS — HEART RATE: 96 BPM

## 2022-07-21 DIAGNOSIS — I10 ESSENTIAL (PRIMARY) HYPERTENSION: ICD-10-CM

## 2022-07-21 DIAGNOSIS — R13.10 DYSPHAGIA, UNSPECIFIED: ICD-10-CM

## 2022-07-21 DIAGNOSIS — E78.5 HYPERLIPIDEMIA, UNSPECIFIED: ICD-10-CM

## 2022-07-21 PROCEDURE — 99214 OFFICE O/P EST MOD 30 MIN: CPT

## 2022-07-21 NOTE — DISCUSSION/SUMMARY
[FreeTextEntry1] : In a summary Arelis De La Cruz is an elderly female with Hypertension, controlled. Continue  Amlodipine 5 mg once daily and 2 gm sodium diet.  Hypercholesterolemia, continue Atorvastatin and low cholesterol diet. LDL goal less than 70 g/dL. Diabetes, on medications and low Carb diet. Etiology of cough unclear. Bruises, mild. Get CBC with PCP.   Increase physical activity. Follow up in 4 months. Spoke with daughter Ms. Barahona.

## 2022-07-21 NOTE — HISTORY OF PRESENT ILLNESS
[FreeTextEntry1] : Arelis De La Cruz is a 72 year old FEMALE  with history of Hypertension, hypercholesterolemia , diabetes and Anxiety comes for follow up visit. Denies any chest pain or palpitations. No shortness of breath on exertion . Complaining of dry cough started 3 months ago. Was seen by Pulmonary, GI and Allergy specialist. Etiology for Cough could not be identified. Has mild Pulmonary fibrosis and was told it is stable. Seen by ENT recently and work up was negative. Olmesartan was discontinued and Amlodipine was started. Still having dry cough. Physically not active. Compliant to medications and diet. Saying having Bruises. On Aspirin.

## 2022-11-23 ENCOUNTER — APPOINTMENT (OUTPATIENT)
Dept: CARDIOLOGY | Facility: CLINIC | Age: 72
End: 2022-11-23

## 2023-06-19 ENCOUNTER — APPOINTMENT (OUTPATIENT)
Dept: ORTHOPEDIC SURGERY | Facility: CLINIC | Age: 73
End: 2023-06-19
Payer: COMMERCIAL

## 2023-06-19 ENCOUNTER — NON-APPOINTMENT (OUTPATIENT)
Age: 73
End: 2023-06-19

## 2023-06-19 VITALS
SYSTOLIC BLOOD PRESSURE: 134 MMHG | HEIGHT: 62 IN | DIASTOLIC BLOOD PRESSURE: 75 MMHG | HEART RATE: 105 BPM | WEIGHT: 128 LBS | BODY MASS INDEX: 23.55 KG/M2

## 2023-06-19 DIAGNOSIS — M54.16 RADICULOPATHY, LUMBAR REGION: ICD-10-CM

## 2023-06-19 PROCEDURE — 99214 OFFICE O/P EST MOD 30 MIN: CPT

## 2023-06-19 PROCEDURE — 72110 X-RAY EXAM L-2 SPINE 4/>VWS: CPT

## 2023-06-19 PROCEDURE — 72170 X-RAY EXAM OF PELVIS: CPT

## 2023-06-19 RX ORDER — TIZANIDINE HYDROCHLORIDE 2 MG/1
2 CAPSULE ORAL
Refills: 0 | Status: DISCONTINUED | COMMUNITY
End: 2023-06-19

## 2023-06-19 RX ORDER — METHYLPREDNISOLONE 4 MG/1
4 TABLET ORAL
Qty: 1 | Refills: 0 | Status: ACTIVE | COMMUNITY
Start: 2023-06-19 | End: 1900-01-01

## 2023-06-23 ENCOUNTER — NON-APPOINTMENT (OUTPATIENT)
Age: 73
End: 2023-06-23

## 2023-06-28 ENCOUNTER — APPOINTMENT (OUTPATIENT)
Dept: ORTHOPEDIC SURGERY | Facility: CLINIC | Age: 73
End: 2023-06-28
Payer: COMMERCIAL

## 2023-06-28 VITALS
SYSTOLIC BLOOD PRESSURE: 149 MMHG | BODY MASS INDEX: 23.55 KG/M2 | WEIGHT: 128 LBS | HEART RATE: 102 BPM | HEIGHT: 62 IN | DIASTOLIC BLOOD PRESSURE: 85 MMHG

## 2023-06-28 PROBLEM — M54.16 RIGHT LUMBAR RADICULITIS: Status: ACTIVE | Noted: 2023-06-19

## 2023-06-28 PROCEDURE — 99214 OFFICE O/P EST MOD 30 MIN: CPT

## 2023-06-28 RX ORDER — GABAPENTIN 100 MG/1
100 CAPSULE ORAL
Qty: 30 | Refills: 2 | Status: ACTIVE | COMMUNITY
Start: 2023-06-28 | End: 1900-01-01

## 2023-06-28 NOTE — HISTORY OF PRESENT ILLNESS
[9] : a current pain level of 9/10 [Daily] : ~He/She~ states the symptoms seem to be occuring daily [Rest] : relieved by rest [de-identified] : Patient is here today for evaluation on her left low back down left leg pain going on for a week, worsened acutely since Friday 6/16/2023 went to Urgent Care gave tylenol #3 every 12 hrs prn.  Patient's daughter states her PCP gave patient baclofen and gabapentin. 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

## 2023-06-28 NOTE — HISTORY OF PRESENT ILLNESS
[8] : a current pain level of 8/10 [Walking] : walking [Standing] : standing [Daily] : ~He/She~ states the symptoms seem to be occuring daily [Acetaminophen] : relieved by acetaminophen [Worsening] : worsening [___ mths] : [unfilled] month(s) ago [de-identified] : Patient is here today to review  mri lumbar spine 6/24/2023.\par Still has severe left leg pain\par Continue to take T#3 [de-identified] : cane

## 2023-06-28 NOTE — PHYSICAL EXAM
[Antalgic] : antalgic [Cane] : ambulates with cane [SLR] : positive straight leg raise [LE] : Sensory: Intact in bilateral lower extremities [0] : left ankle jerk 0 [DP] : dorsalis pedis 2+ and symmetric bilaterally [Plantar Reflex Right Only] : absent on the right [Plantar Reflex Left Only] : absent on the left [DTR Reflexes Clonus Of Right Ankle (___ Beats)] : absent on the right [DTR Reflexes Clonus Of Left Ankle (___ Beats)] : absent on the left [de-identified] : seen with her daughter who was on telephone\par The pt is awake, alert and oriented to self, place and time, is comfortable and in no acute distress. Inspection of neck, back and lower extremities bilaterally reveals no rashes or ecchymotic lesions. There is no tenderness over the cervical, thoracic or lumbar spine, or the paraspinal or upper and lower extremities musculature. There is no sacroiliac tenderness. No greater trochanteric tenderness bilaterally. No atrophy or abnormal movements noted in the upper or lower extremities. There is no swelling noted in the upper or lower extremities bilaterally. No cervical lymphadenopathy noted anteriorly. No joint laxity noted in the upper and lower extremity joints bilaterally.\par Hip range of motion is degrees internal rotation 30° external rotation without pain. Full range of motion of the shoulders bilaterally with no significant pain\par There is no groin pain with hip internal rotation and a negative LOI test bilaterally.  [de-identified] : ext 30 degrees with pain [de-identified] :  11:53:23 [facility]  -- Final Report\par \par Patient Name : ANAND^HERNÁN^^^^\par Patient ID : [id number]\par Patient  : 1950\par Accession Number : [id number]\par \par \par PATIENT NAME: HERNÁN MICHEL\par \par ID NUMBER: [id number]\par \par YOB: 1950\par \par REFERRING PHYSICIAN: DARIO GAMBOA MD\par [address]\par [address]\par [place] [address]\par \par DATE OF SERVICE: 2023\par \par MAGNETIC RESONANCE IMAGING OF THE LUMBAR SPINE\par \par TECHNIQUE: Multiplanar, multisequential MRI was performed in the\par neutral/sitting position.\par \par HISTORY: The patient complains of low back pain with difficulty walking.\par \par COMPARISON: Correlation is made with study of 2008.\par \par INTERPRETATION: Scoliotic lumbar curvature is again noted.\par \par T9/10 partial assessment demonstrates right facet hypertrophic change.\par \par T10/11 partial assessment demonstrates left paracentral posterior disc\par herniation impressing the thecal sac.\par \par T12/L1 posterior broad-based central disc bulge is seen.\par \par The thoracic spine is incompletely assessed currently and may be further\par evaluated by exam tailored for this region of felt warranted.\par \par L1/2 posterior peripheral disc herniation is currently seen extending into the\par anteroinferior left foramina.\par \par L2/3 posterior broad-based central and peripheral extending disc bulge is\par again identified.\par \par L3/4 posterior broad-based central disc herniation impresses the thecal sac\par and extends into the left greater than right foramina with left greater than\par right foraminal narrowing. Facet hypertrophic change is also noted at this\par level. Motion degradation in this study somewhat limits evaluation of this\par level. Findings at this level are not felt significantly advanced from prior\par study within limits of this evaluation.\par \par L4/5 posterior broad-based central disc herniation is seen increasing from\par prior study with peripheral extension into the left greater than right\par foramina. Facet hypertrophic change is also identified at this level. There is\par increased herniation seen particularly extending into the left foramina with\par left foraminal prominent stenosis and impingement on the exiting left L4 root.\par Room for exit of right L4 root remains. Central canal stenosis is also\par identified. Findings at this level have significantly advanced from prior\par study.\par \par L5/S1 posterior broad-based central and peripheral disc bulging is seen with\par extension into the bilateral foramina, right greater than left. Room for exit\par of L5 roots remains. There is impression on the ventral traversing left S1\par root. Facet hypertrophic change is also noted at this level. Findings at this\par level have advanced from previously.\par \par Diffuse thoracolumbar disc hydration loss is noted with diminished disc space\par height at T10/11 and L4/5, advancing from previously, with T8/9 through T10/11\par and L3/4 through L5/S1 variable anterior disc extensions and anterior\par spurring. The conus medullaris tapers at L1.\par \par Examination otherwise demonstrates the remaining lumbar vertebral bodies and\par intervertebral discs to be unremarkable in height and signal. The conus\par medullaris is unremarkable in signal, morphology and position. No focal\par prevertebral or posterior paraspinal abnormal masses or altered signals are\par otherwise noted.\par \par IMPRESSION:\par \par * Study limited by motion, as above.\par \par * Scoliotic lumbar curvature again noted.\par \par * T9/10 partial assessment right facet hypertrophic change.\par \par * T10/11 partial assessment left paracentral posterior disc herniation\par impressing the thecal sac.\par \par * T12/L1 posterior broad-based central disc bulge.\par \par * L1/2 posterior peripheral disc herniation currently seen extending into the\par anteroinferior left foramina.\par \par * L2/3 posterior broad-based central and peripheral extending disc bulge again\par identified.\par \par * L3/4 posterior broad-based central disc herniation impresses the thecal sac\par and extends into the left greater than right foramina with left greater than\par right foraminal narrowing. Facet hypertrophic change is also noted at this\par level. Motion degradation in this study somewhat limits evaluation of this\par level. Findings at this level are not felt significantly advanced from prior\par study within limits of this evaluation.\par \par * L4/5 posterior broad-based central disc herniation increasing from prior\par study with peripheral extension into the left greater than right foramina.\par Facet hypertrophic change is also identified at this level. There is increased\par herniation seen particularly extending into the left foramina with left\par foraminal prominent stenosis and impingement on the exiting left L4 root. Room\par for exit of right L4 root remains. Central canal stenosis is also identified.\par Findings at this level have significantly advanced from prior study.\par \par * L5/S1 posterior broad-based central and peripheral disc bulging with\par extension into the bilateral foramina, right greater than left. Room for exit\par of L5 roots remains. There is impression on the ventral traversing left S1\par root. Facet hypertrophic change is also noted at this level. Findings at this\par level have advanced from previously.\par \par Dalton Cerda M.D.\par \par Diplomate of the American Board of Radiology\par

## 2023-06-28 NOTE — PHYSICAL EXAM
[Antalgic] : antalgic [Cane] : ambulates with cane [SLR] : positive straight leg raise [LE] : Sensory: Intact in bilateral lower extremities [0] : left ankle jerk 0 [DP] : dorsalis pedis 2+ and symmetric bilaterally [Plantar Reflex Right Only] : absent on the right [Plantar Reflex Left Only] : absent on the left [DTR Reflexes Clonus Of Right Ankle (___ Beats)] : absent on the right [DTR Reflexes Clonus Of Left Ankle (___ Beats)] : absent on the left [de-identified] : seen with her daughter who was on telephone\par The pt is awake, alert and oriented to self, place and time, is comfortable and in no acute distress. Inspection of neck, back and lower extremities bilaterally reveals no rashes or ecchymotic lesions. There is no tenderness over the cervical, thoracic or lumbar spine, or the paraspinal or upper and lower extremities musculature. There is no sacroiliac tenderness. No greater trochanteric tenderness bilaterally. No atrophy or abnormal movements noted in the upper or lower extremities. There is no swelling noted in the upper or lower extremities bilaterally. No cervical lymphadenopathy noted anteriorly. No joint laxity noted in the upper and lower extremity joints bilaterally.\par Hip range of motion is degrees internal rotation 30° external rotation without pain. Full range of motion of the shoulders bilaterally with no significant pain\par There is no groin pain with hip internal rotation and a negative LOI test bilaterally.  [de-identified] : ext 30 degrees with pain [de-identified] : 4 views lumbar spine demonstrate left-sided lumbar curve which measures approximately 10 degrees from L1-L5.  Vascular clips right upper quadrant.  On the lateral projection stooped forward posture noted with minimal anterolisthesis at L4-5.  No dynamic instability between flexion-extension though there is subtle improvement of anterolisthesis in extension suggesting mild early spinal listhesis.  Calcification trace in the abdominal aorta.  No obvious acute fractures.\par \par AP pelvis demonstrates normal appearance hips bilaterally.  No acute fractures.  No significant degeneration.  Pelvic obliquity is noted with left side lower than the right suggestive of limb length discrepancy.

## 2023-06-28 NOTE — PHYSICAL EXAM
[Antalgic] : antalgic [Cane] : ambulates with cane [SLR] : positive straight leg raise [LE] : Sensory: Intact in bilateral lower extremities [0] : left ankle jerk 0 [DP] : dorsalis pedis 2+ and symmetric bilaterally [Plantar Reflex Right Only] : absent on the right [Plantar Reflex Left Only] : absent on the left [DTR Reflexes Clonus Of Right Ankle (___ Beats)] : absent on the right [DTR Reflexes Clonus Of Left Ankle (___ Beats)] : absent on the left [de-identified] : seen with her daughter who was on telephone\par The pt is awake, alert and oriented to self, place and time, is comfortable and in no acute distress. Inspection of neck, back and lower extremities bilaterally reveals no rashes or ecchymotic lesions. There is no tenderness over the cervical, thoracic or lumbar spine, or the paraspinal or upper and lower extremities musculature. There is no sacroiliac tenderness. No greater trochanteric tenderness bilaterally. No atrophy or abnormal movements noted in the upper or lower extremities. There is no swelling noted in the upper or lower extremities bilaterally. No cervical lymphadenopathy noted anteriorly. No joint laxity noted in the upper and lower extremity joints bilaterally.\par Hip range of motion is degrees internal rotation 30° external rotation without pain. Full range of motion of the shoulders bilaterally with no significant pain\par There is no groin pain with hip internal rotation and a negative LOI test bilaterally.  [de-identified] : ext 30 degrees with pain [de-identified] : 4 views lumbar spine demonstrate left-sided lumbar curve which measures approximately 10 degrees from L1-L5.  Vascular clips right upper quadrant.  On the lateral projection stooped forward posture noted with minimal anterolisthesis at L4-5.  No dynamic instability between flexion-extension though there is subtle improvement of anterolisthesis in extension suggesting mild early spinal listhesis.  Calcification trace in the abdominal aorta.  No obvious acute fractures.\par \par AP pelvis demonstrates normal appearance hips bilaterally.  No acute fractures.  No significant degeneration.  Pelvic obliquity is noted with left side lower than the right suggestive of limb length discrepancy.

## 2023-06-28 NOTE — DISCUSSION/SUMMARY
[Medication Risks Reviewed] : Medication risks reviewed [de-identified] : MRI lumbar spine performed previously was independent reviewed by me and findings discussed with the patient as well as the patient's daughter over the telephone.  She appears to have a foraminal disc herniation left L4-5.  Based on her current presentation and severe pain we discussed the option of a discectomy for this which could be considered endoscopically versus small incision centrally and she will look into that option.  However for now the family is not interested in surgery and would like to try an epidural steroid injection which we will schedule at the earliest convenience left L4 and L5.  She will continue Tylenol 3 which is somewhat helpful for her.  Gabapentin was prescribed at her last visit but for some reason was canceled.  This was refilled for her again today and I spoke with the patient and her daughter about that.\par \par The patient was educated regarding their condition, treatment options as well as prescribed course of treatment. \par Risks and benefits as well as alternatives to the proposed treatment were also provided to the patient \par They were given the opportunity to have all their questions answered to their satisfaction.\par \par Vital signs were reviewed with the patient and the patient was instructed to followup with their primary care provider for further management. There were no PAs or scribes used in the evaluation, exam or treatment plan discussion. The surgeon was the primary evaluating or treating physician as noted above.

## 2023-06-28 NOTE — DISCUSSION/SUMMARY
[Medication Risks Reviewed] : Medication risks reviewed [de-identified] : Patient has symptoms consistent with lumbar radiculopathy with degenerative changes noted on x-rays today.  Recommend MRI lumbar spine for further evaluation.  For symptoms prescribed her a course of oral steroids and gabapentin.  I will see her back after the MRI to discuss further treatment options which may include lumbar epidural steroid injection and/or surgical intervention as appropriate.\par \par The patient was educated regarding their condition, treatment options as well as prescribed course of treatment. \par Risks and benefits as well as alternatives to the proposed treatment were also provided to the patient \par They were given the opportunity to have all their questions answered to their satisfaction.\par \par Vital signs were reviewed with the patient and the patient was instructed to followup with their primary care provider for further management. There were no PAs or scribes used in the evaluation, exam or treatment plan discussion. The surgeon was the primary evaluating or treating physician as noted above.

## 2023-06-28 NOTE — DISCUSSION/SUMMARY
[Medication Risks Reviewed] : Medication risks reviewed [de-identified] : Patient has symptoms consistent with lumbar radiculopathy with degenerative changes noted on x-rays today.  Recommend MRI lumbar spine for further evaluation.  For symptoms prescribed her a course of oral steroids and gabapentin.  I will see her back after the MRI to discuss further treatment options which may include lumbar epidural steroid injection and/or surgical intervention as appropriate.\par \par The patient was educated regarding their condition, treatment options as well as prescribed course of treatment. \par Risks and benefits as well as alternatives to the proposed treatment were also provided to the patient \par They were given the opportunity to have all their questions answered to their satisfaction.\par \par Vital signs were reviewed with the patient and the patient was instructed to followup with their primary care provider for further management. There were no PAs or scribes used in the evaluation, exam or treatment plan discussion. The surgeon was the primary evaluating or treating physician as noted above.

## 2023-06-28 NOTE — HISTORY OF PRESENT ILLNESS
[9] : a current pain level of 9/10 [Daily] : ~He/She~ states the symptoms seem to be occuring daily [Rest] : relieved by rest [de-identified] : Patient is here today for evaluation on her left low back down left leg pain going on for a week, worsened acutely since Friday 6/16/2023 went to Urgent Care gave tylenol #3 every 12 hrs prn.  Patient's daughter states her PCP gave patient baclofen and gabapentin. 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

## 2023-06-29 ENCOUNTER — NON-APPOINTMENT (OUTPATIENT)
Age: 73
End: 2023-06-29

## 2023-07-05 ENCOUNTER — TRANSCRIPTION ENCOUNTER (OUTPATIENT)
Age: 73
End: 2023-07-05

## 2023-07-05 NOTE — ASU DISCHARGE PLAN (ADULT/PEDIATRIC) - NS MD DC FALL RISK RISK
For information on Fall & Injury Prevention, visit: [email or web address] OR  [email or web address] OR  https://www.cdc.gov/steadi/patient.html

## 2023-07-05 NOTE — ASU PATIENT PROFILE, ADULT - NSICDXPASTMEDICALHX_GEN_ALL_CORE_FT
PAST MEDICAL HISTORY:  Cervicalgia     Hyperlipidemia     Hypertension     Interstitial lung disease     Malignant neoplasm of skin     Type 2 diabetes mellitus

## 2023-07-07 ENCOUNTER — APPOINTMENT (OUTPATIENT)
Dept: ORTHOPEDIC SURGERY | Facility: HOSPITAL | Age: 73
End: 2023-07-07

## 2023-07-07 ENCOUNTER — OUTPATIENT (OUTPATIENT)
Dept: OUTPATIENT SERVICES | Facility: HOSPITAL | Age: 73
LOS: 1 days | Discharge: HOME | End: 2023-07-07
Payer: COMMERCIAL

## 2023-07-07 VITALS
HEART RATE: 99 BPM | HEIGHT: 61 IN | OXYGEN SATURATION: 94 % | RESPIRATION RATE: 25 BRPM | SYSTOLIC BLOOD PRESSURE: 116 MMHG | WEIGHT: 139.99 LBS | DIASTOLIC BLOOD PRESSURE: 67 MMHG | TEMPERATURE: 98 F

## 2023-07-07 VITALS
HEART RATE: 101 BPM | TEMPERATURE: 98 F | RESPIRATION RATE: 25 BRPM | OXYGEN SATURATION: 96 % | DIASTOLIC BLOOD PRESSURE: 78 MMHG | SYSTOLIC BLOOD PRESSURE: 113 MMHG

## 2023-07-07 DIAGNOSIS — M54.16 RADICULOPATHY, LUMBAR REGION: ICD-10-CM

## 2023-07-07 DIAGNOSIS — M51.26 OTHER INTERVERTEBRAL DISC DISPLACEMENT, LUMBAR REGION: ICD-10-CM

## 2023-07-07 LAB — GLUCOSE BLDC GLUCOMTR-MCNC: 262 MG/DL — HIGH (ref 70–99)

## 2023-07-07 PROCEDURE — 64484 NJX AA&/STRD TFRM EPI L/S EA: CPT | Mod: LT

## 2023-07-07 PROCEDURE — 62323 NJX INTERLAMINAR LMBR/SAC: CPT

## 2023-07-07 PROCEDURE — 82962 GLUCOSE BLOOD TEST: CPT

## 2023-07-07 PROCEDURE — 64483 NJX AA&/STRD TFRM EPI L/S 1: CPT | Mod: LT

## 2023-07-07 RX ORDER — MECLIZINE HCL 12.5 MG
1 TABLET ORAL
Refills: 0 | DISCHARGE

## 2023-07-07 RX ORDER — CLONAZEPAM 1 MG
1 TABLET ORAL
Refills: 0 | DISCHARGE

## 2023-07-07 RX ORDER — NITROFURANTOIN MACROCRYSTAL 50 MG
1 CAPSULE ORAL
Refills: 0 | DISCHARGE

## 2023-07-07 RX ORDER — CEFDINIR 250 MG/5ML
1 POWDER, FOR SUSPENSION ORAL
Refills: 0 | DISCHARGE

## 2023-07-07 RX ORDER — MELOXICAM 15 MG/1
1 TABLET ORAL
Refills: 0 | DISCHARGE

## 2023-07-07 RX ORDER — CETIRIZINE HYDROCHLORIDE 10 MG/1
1 TABLET ORAL
Refills: 0 | DISCHARGE

## 2023-07-07 RX ORDER — PRAMIPEXOLE DIHYDROCHLORIDE 0.12 MG/1
1 TABLET ORAL
Refills: 0 | DISCHARGE

## 2023-07-07 RX ORDER — ASPIRIN/CALCIUM CARB/MAGNESIUM 324 MG
1 TABLET ORAL
Refills: 0 | DISCHARGE

## 2023-07-07 RX ORDER — HYDROCODONE BITARTRATE AND HOMATROPINE METHYLBROMIDE 5; 1.5 MG/5ML; MG/5ML
5 SOLUTION ORAL
Refills: 0 | DISCHARGE

## 2023-07-07 RX ORDER — ACETAMINOPHEN WITH CODEINE 300MG-30MG
1 TABLET ORAL
Refills: 0 | DISCHARGE

## 2023-07-07 RX ORDER — FOLIC ACID 0.8 MG
1 TABLET ORAL
Refills: 0 | DISCHARGE

## 2023-07-10 PROBLEM — M54.2 CERVICALGIA: Chronic | Status: ACTIVE | Noted: 2023-07-05

## 2023-07-10 PROBLEM — E11.9 TYPE 2 DIABETES MELLITUS WITHOUT COMPLICATIONS: Chronic | Status: ACTIVE | Noted: 2023-07-05

## 2023-07-10 PROBLEM — C44.90 UNSPECIFIED MALIGNANT NEOPLASM OF SKIN, UNSPECIFIED: Chronic | Status: ACTIVE | Noted: 2023-07-05

## 2023-07-10 PROBLEM — I10 ESSENTIAL (PRIMARY) HYPERTENSION: Chronic | Status: ACTIVE | Noted: 2023-07-05

## 2023-07-10 PROBLEM — E78.5 HYPERLIPIDEMIA, UNSPECIFIED: Chronic | Status: ACTIVE | Noted: 2023-07-05

## 2023-07-19 ENCOUNTER — APPOINTMENT (OUTPATIENT)
Dept: ORTHOPEDIC SURGERY | Facility: CLINIC | Age: 73
End: 2023-07-19
Payer: COMMERCIAL

## 2023-07-19 DIAGNOSIS — M54.16 RADICULOPATHY, LUMBAR REGION: ICD-10-CM

## 2023-07-19 DIAGNOSIS — M51.26 OTHER INTERVERTEBRAL DISC DISPLACEMENT, LUMBAR REGION: ICD-10-CM

## 2023-07-19 PROCEDURE — 99214 OFFICE O/P EST MOD 30 MIN: CPT | Mod: 57

## 2023-07-19 NOTE — REASON FOR VISIT
[Follow-Up Visit] : a follow-up visit for [Herniated Discs] : herniated discs [Back Pain] : back pain [Radiculopathy] : radiculopathy [Spondylolistheses] : spondylolistheses

## 2023-07-19 NOTE — HISTORY OF PRESENT ILLNESS
[Walking] : walking [Standing] : standing [Daily] : ~He/She~ states the symptoms seem to be occuring daily [Acetaminophen] : relieved by acetaminophen [Worsening] : worsening [___ wks] : [unfilled] week(s) ago [de-identified] : Patient is here 2 weeks post lumbar epidural injection 7/6/2023 left L4, L5 XANDER\par  Patient states since getting the injection 30% less pain with sitting and lying in bed with standing and walking left leg goes numb tingling started couple of days after the injection. [de-identified] : lumbar epidural injection

## 2023-07-19 NOTE — DISCUSSION/SUMMARY
[Medication Risks Reviewed] : Medication risks reviewed [de-identified] : spoke with patients daughter over telephone and patient in person. Given the progressive nature of her symptoms with increased numbness and numbness diffuse weakness after standing for a few minutes in the left leg.  Her symptoms are below the left knee and I indicated to her that given the extrusion along the lateral recess and the foramen on the left side L4-5 and the radicular pains that she is presenting with as well as subjective weakness surgical intervention would be the most appropriate intervention since the injections did not completely alleviate her symptoms and her condition is getting worse.\par \par We will try and schedule left L4-5 microdiscectomy at her earliest convenience.\par \par The patient was educated regarding their condition, treatment options as well as prescribed course of treatment. \par Risks and benefits as well as alternatives to the proposed treatment were also provided to the patient \par They were given the opportunity to have all their questions answered to their satisfaction.\par \par Vital signs were reviewed with the patient and the patient was instructed to followup with their primary care provider for further management. There were no PAs or scribes used in the evaluation, exam or treatment plan discussion. The surgeon was the primary evaluating or treating physician as noted above.\par

## 2023-07-19 NOTE — PHYSICAL EXAM
[Antalgic] : antalgic [Cane] : ambulates with cane [SLR] : positive straight leg raise [LE] : 5/5 motor strength in bilateral lower extremities [] : Sensory: [L4-LT] : L4 [L5-LT] : L5 [0] : left ankle jerk 0 [DP] : dorsalis pedis 2+ and symmetric bilaterally [Plantar Reflex Right Only] : absent on the right [Plantar Reflex Left Only] : absent on the left [DTR Reflexes Clonus Of Right Ankle (___ Beats)] : absent on the right [DTR Reflexes Clonus Of Left Ankle (___ Beats)] : absent on the left [de-identified] : seen with her daughter who was on telephone\par The pt is awake, alert and oriented to self, place and time, is comfortable and in no acute distress. Inspection of neck, back and lower extremities bilaterally reveals no rashes or ecchymotic lesions. There is no tenderness over the cervical, thoracic or lumbar spine, or the paraspinal or upper and lower extremities musculature. There is no sacroiliac tenderness. No greater trochanteric tenderness bilaterally. No atrophy or abnormal movements noted in the upper or lower extremities. There is no swelling noted in the upper or lower extremities bilaterally. No cervical lymphadenopathy noted anteriorly. No joint laxity noted in the upper and lower extremity joints bilaterally.\par Hip range of motion is degrees internal rotation 30° external rotation without pain. Full range of motion of the shoulders bilaterally with no significant pain\par There is no groin pain with hip internal rotation and a negative LIO test bilaterally.  [de-identified] : ext 30 degrees with pain

## 2023-07-21 ENCOUNTER — OUTPATIENT (OUTPATIENT)
Dept: OUTPATIENT SERVICES | Facility: HOSPITAL | Age: 73
LOS: 1 days | End: 2023-07-21
Payer: COMMERCIAL

## 2023-07-21 VITALS
HEART RATE: 86 BPM | OXYGEN SATURATION: 96 % | WEIGHT: 134.48 LBS | SYSTOLIC BLOOD PRESSURE: 135 MMHG | TEMPERATURE: 97 F | DIASTOLIC BLOOD PRESSURE: 68 MMHG | HEIGHT: 60 IN | RESPIRATION RATE: 16 BRPM

## 2023-07-21 DIAGNOSIS — Z01.818 ENCOUNTER FOR OTHER PREPROCEDURAL EXAMINATION: ICD-10-CM

## 2023-07-21 DIAGNOSIS — Z98.891 HISTORY OF UTERINE SCAR FROM PREVIOUS SURGERY: Chronic | ICD-10-CM

## 2023-07-21 DIAGNOSIS — M54.16 RADICULOPATHY, LUMBAR REGION: ICD-10-CM

## 2023-07-21 DIAGNOSIS — M51.26 OTHER INTERVERTEBRAL DISC DISPLACEMENT, LUMBAR REGION: ICD-10-CM

## 2023-07-21 DIAGNOSIS — Z90.49 ACQUIRED ABSENCE OF OTHER SPECIFIED PARTS OF DIGESTIVE TRACT: Chronic | ICD-10-CM

## 2023-07-21 LAB
A1C WITH ESTIMATED AVERAGE GLUCOSE RESULT: 10.2 % — HIGH (ref 4–5.6)
ALBUMIN SERPL ELPH-MCNC: 4 G/DL — SIGNIFICANT CHANGE UP (ref 3.3–5)
ALP SERPL-CCNC: 156 U/L — HIGH (ref 30–120)
ALT FLD-CCNC: 77 U/L DA — HIGH (ref 10–60)
ANION GAP SERPL CALC-SCNC: 13 MMOL/L — SIGNIFICANT CHANGE UP (ref 5–17)
APTT BLD: 25.8 SEC — LOW (ref 27.5–35.5)
AST SERPL-CCNC: 36 U/L — SIGNIFICANT CHANGE UP (ref 10–40)
BILIRUB SERPL-MCNC: 0.3 MG/DL — SIGNIFICANT CHANGE UP (ref 0.2–1.2)
BLD GP AB SCN SERPL QL: SIGNIFICANT CHANGE UP
BUN SERPL-MCNC: 26 MG/DL — HIGH (ref 7–23)
CALCIUM SERPL-MCNC: 9.7 MG/DL — SIGNIFICANT CHANGE UP (ref 8.4–10.5)
CHLORIDE SERPL-SCNC: 97 MMOL/L — SIGNIFICANT CHANGE UP (ref 96–108)
CO2 SERPL-SCNC: 25 MMOL/L — SIGNIFICANT CHANGE UP (ref 22–31)
CREAT SERPL-MCNC: 0.98 MG/DL — SIGNIFICANT CHANGE UP (ref 0.5–1.3)
EGFR: 62 ML/MIN/1.73M2 — SIGNIFICANT CHANGE UP
ESTIMATED AVERAGE GLUCOSE: 246 MG/DL — HIGH (ref 68–114)
GLUCOSE SERPL-MCNC: 179 MG/DL — HIGH (ref 70–99)
HCT VFR BLD CALC: 41.7 % — SIGNIFICANT CHANGE UP (ref 34.5–45)
HGB BLD-MCNC: 13.9 G/DL — SIGNIFICANT CHANGE UP (ref 11.5–15.5)
INR BLD: 0.99 RATIO — SIGNIFICANT CHANGE UP (ref 0.88–1.16)
MCHC RBC-ENTMCNC: 29.5 PG — SIGNIFICANT CHANGE UP (ref 27–34)
MCHC RBC-ENTMCNC: 33.3 GM/DL — SIGNIFICANT CHANGE UP (ref 32–36)
MCV RBC AUTO: 88.5 FL — SIGNIFICANT CHANGE UP (ref 80–100)
NRBC # BLD: 0 /100 WBCS — SIGNIFICANT CHANGE UP (ref 0–0)
PLATELET # BLD AUTO: 201 K/UL — SIGNIFICANT CHANGE UP (ref 150–400)
POTASSIUM SERPL-MCNC: 3.6 MMOL/L — SIGNIFICANT CHANGE UP (ref 3.5–5.3)
POTASSIUM SERPL-SCNC: 3.6 MMOL/L — SIGNIFICANT CHANGE UP (ref 3.5–5.3)
PROT SERPL-MCNC: 7.9 G/DL — SIGNIFICANT CHANGE UP (ref 6–8.3)
PROTHROM AB SERPL-ACNC: 11.4 SEC — SIGNIFICANT CHANGE UP (ref 10.5–13.4)
RBC # BLD: 4.71 M/UL — SIGNIFICANT CHANGE UP (ref 3.8–5.2)
RBC # FLD: 13.5 % — SIGNIFICANT CHANGE UP (ref 10.3–14.5)
SODIUM SERPL-SCNC: 135 MMOL/L — SIGNIFICANT CHANGE UP (ref 135–145)
WBC # BLD: 12.8 K/UL — HIGH (ref 3.8–10.5)
WBC # FLD AUTO: 12.8 K/UL — HIGH (ref 3.8–10.5)

## 2023-07-21 PROCEDURE — 93005 ELECTROCARDIOGRAM TRACING: CPT

## 2023-07-21 PROCEDURE — G0463: CPT

## 2023-07-21 PROCEDURE — 93010 ELECTROCARDIOGRAM REPORT: CPT

## 2023-07-21 RX ORDER — AZELASTINE 137 UG/1
2 SPRAY, METERED NASAL
Refills: 0 | DISCHARGE

## 2023-07-21 RX ORDER — DULAGLUTIDE 4.5 MG/.5ML
1.5 INJECTION, SOLUTION SUBCUTANEOUS
Refills: 0 | DISCHARGE

## 2023-07-21 RX ORDER — ATORVASTATIN CALCIUM 80 MG/1
1 TABLET, FILM COATED ORAL
Refills: 0 | DISCHARGE

## 2023-07-21 RX ORDER — METFORMIN HYDROCHLORIDE 850 MG/1
1 TABLET ORAL
Refills: 0 | DISCHARGE

## 2023-07-21 RX ORDER — GABAPENTIN 400 MG/1
1 CAPSULE ORAL
Refills: 0 | DISCHARGE

## 2023-07-21 RX ORDER — SERTRALINE 25 MG/1
1 TABLET, FILM COATED ORAL
Refills: 0 | DISCHARGE

## 2023-07-21 RX ORDER — AMLODIPINE BESYLATE 2.5 MG/1
1 TABLET ORAL
Refills: 0 | DISCHARGE

## 2023-07-21 RX ORDER — FLUTICASONE PROPIONATE 50 MCG
2 SPRAY, SUSPENSION NASAL
Refills: 0 | DISCHARGE

## 2023-07-21 RX ORDER — QUETIAPINE FUMARATE 200 MG/1
1 TABLET, FILM COATED ORAL
Refills: 0 | DISCHARGE

## 2023-07-21 NOTE — H&P PST ADULT - NSICDXPASTSURGICALHX_GEN_ALL_CORE_FT
PAST SURGICAL HISTORY:  S/P  section     S/P  section     S/P  section     S/P laparoscopic cholecystectomy

## 2023-07-21 NOTE — H&P PST ADULT - MUSCULOSKELETAL
Priscilla Carver MD       [facility] - [place], [place] - [address]   [place] [address]  Phone: [phone] Fax: [phone]    [facility] - [place], [place] - [address]  [address]  [facility] [address]  Phone: [phone] Fax: [phone]    [facility] [place], [place] - [address]  [address] [facility] [address]  Phone: [phone] Fax: [phone]    [facility] [place] - [address] [facility] [address]  [address]  [facility] [address]  Phone: [phone] Fax: [phone]    Extended Emergency Contact Information  Primary Emergency Contact: Abi Richardson  Address: [address] [place] [address]  Mobile Phone: [phone]  Relation: Spouse     Payor: BLUE CROSS BLUE SHIELD / Plan: BCBS ALL OUT OF STATE / Product Type: PPO /         09/15/17 1630   Discharge Assessment   Assessment Type Discharge Planning Assessment   Confirmed/corrected address and phone number on facesheet? Yes   Assessment information obtained from? Patient   Expected Length of Stay (days) 3   Communicated expected length of stay with patient/caregiver yes   Prior to hospitilization cognitive status: Alert/Oriented   Prior to hospitalization functional status: Independent   Current cognitive status: Alert/Oriented   Current Functional Status: Independent   Lives With spouse   Able to Return to Prior Arrangements yes   Is patient able to care for self after discharge? Yes   Who are your caregiver(s) and their phone number(s)? Alondra Richardosn   Patient's perception of discharge disposition home or selfcare   Readmission Within The Last 30 Days no previous admission in last 30 days   Patient currently being followed by outpatient case management? No   Patient currently receives any other outside agency services? No   Equipment Currently Used at Home none   Do you have any problems  affording any of your prescribed medications? No   Is the patient taking medications as prescribed? yes   Does the patient have transportation home? Yes   Transportation Available family or friend will provide   Does the patient receive services at the [facility]madin Clinic? No   Discharge Plan A Home with family   Discharge Plan B Home with family   Patient/Family In Agreement With Plan yes      details… lumbar spine/decreased ROM/decreased ROM due to pain/decreased strength/abnormal gait

## 2023-07-21 NOTE — PROVIDER CONTACT NOTE (OTHER) - ASSESSMENT
The Spine Pre-Operative Education packet was given to the patient on 7/19/23. The patient and NP reviewed the information included in the packet. All her questions were answered and she gave a clear understanding of the instructions. She was advised to call the office at any time with further questions or concerns.

## 2023-07-21 NOTE — H&P PST ADULT - PROBLEM SELECTOR PLAN 1
LEFT Lumbar L4-5 Microdiscectomy on 7/27/23  Medical Clearance Dr. Concepcion  NPO as per Anesthesia- no meds on AM of surgery (unless pain is severe, then Tylenol#3 w/ sip of water ok  Hold Metformin on 7/26 and on AM of surgery  Trulicity on Monday 7/24 ok.  Instructions reviewed and questions addressed.

## 2023-07-21 NOTE — H&P PST ADULT - NSICDXPASTMEDICALHX_GEN_ALL_CORE_FT
PAST MEDICAL HISTORY:  Anxiety and depression     Cervicalgia     Disc disorder of lumbar region     Hyperlipidemia     Hypertension     Interstitial lung disease     Malignant neoplasm of skin     Type 2 diabetes mellitus

## 2023-07-21 NOTE — H&P PST ADULT - HISTORY OF PRESENT ILLNESS
74yo female patient with long history of low back pain which became more acute over the past 2 months. She has had epidural steroid injections with some relief and she has been taking Prednisone. She rates the pain at 8-10/10 and she is taking Tylenol #3 once daily with some relief. Surgery has been recommended and she presents today for PSTs.  72yo female patient with long history of low back pain which became more acute over the past 2 months. She has had epidural steroid injections with some relief and she has been taking Prednisone until today, it was discontinued. She rates the pain at 8-10/10 and she is taking Tylenol #3 once daily with some relief. Surgery has been recommended and she presents today for PSTs.

## 2023-07-21 NOTE — H&P PST ADULT - NSICDXFAMILYHX_GEN_ALL_CORE_FT
FAMILY HISTORY:  Father  Still living? No  Family history of lung cancer, Age at diagnosis: Age Unknown    Mother  Still living? No  Family history of DVT, Age at diagnosis: Age Unknown  Family history of myocardial infarction, Age at diagnosis: Age Unknown

## 2023-07-26 ENCOUNTER — NON-APPOINTMENT (OUTPATIENT)
Age: 73
End: 2023-07-26

## 2023-07-26 PROBLEM — F41.9 ANXIETY DISORDER, UNSPECIFIED: Chronic | Status: ACTIVE | Noted: 2023-07-21

## 2023-07-26 PROBLEM — M51.9 UNSPECIFIED THORACIC, THORACOLUMBAR AND LUMBOSACRAL INTERVERTEBRAL DISC DISORDER: Chronic | Status: ACTIVE | Noted: 2023-07-21

## 2023-07-27 ENCOUNTER — APPOINTMENT (OUTPATIENT)
Dept: ORTHOPEDIC SURGERY | Facility: HOSPITAL | Age: 73
End: 2023-07-27

## 2023-07-31 ENCOUNTER — APPOINTMENT (OUTPATIENT)
Dept: ORTHOPEDIC SURGERY | Facility: CLINIC | Age: 73
End: 2023-07-31
Payer: COMMERCIAL

## 2023-07-31 PROCEDURE — 99214 OFFICE O/P EST MOD 30 MIN: CPT | Mod: 57

## 2023-08-04 RX ORDER — ACETAMINOPHEN AND CODEINE PHOSPHATE 300; 30 MG/1; MG/1
300-30 TABLET ORAL TWICE DAILY
Qty: 30 | Refills: 0 | Status: ACTIVE | COMMUNITY
Start: 2023-08-04 | End: 1900-01-01

## 2023-08-15 ENCOUNTER — OUTPATIENT (OUTPATIENT)
Dept: OUTPATIENT SERVICES | Facility: HOSPITAL | Age: 73
LOS: 1 days | End: 2023-08-15

## 2023-08-15 VITALS
DIASTOLIC BLOOD PRESSURE: 78 MMHG | TEMPERATURE: 97 F | OXYGEN SATURATION: 98 % | SYSTOLIC BLOOD PRESSURE: 131 MMHG | RESPIRATION RATE: 12 BRPM | WEIGHT: 126.99 LBS | HEART RATE: 90 BPM | HEIGHT: 61 IN

## 2023-08-15 DIAGNOSIS — Z90.49 ACQUIRED ABSENCE OF OTHER SPECIFIED PARTS OF DIGESTIVE TRACT: Chronic | ICD-10-CM

## 2023-08-15 DIAGNOSIS — Z98.891 HISTORY OF UTERINE SCAR FROM PREVIOUS SURGERY: Chronic | ICD-10-CM

## 2023-08-15 DIAGNOSIS — M51.9 UNSPECIFIED THORACIC, THORACOLUMBAR AND LUMBOSACRAL INTERVERTEBRAL DISC DISORDER: ICD-10-CM

## 2023-08-15 DIAGNOSIS — M51.26 OTHER INTERVERTEBRAL DISC DISPLACEMENT, LUMBAR REGION: ICD-10-CM

## 2023-08-15 DIAGNOSIS — Z01.818 ENCOUNTER FOR OTHER PREPROCEDURAL EXAMINATION: ICD-10-CM

## 2023-08-15 LAB
ALBUMIN SERPL ELPH-MCNC: 4.4 G/DL — SIGNIFICANT CHANGE UP (ref 3.3–5)
ALP SERPL-CCNC: 114 U/L — SIGNIFICANT CHANGE UP (ref 30–120)
ALT FLD-CCNC: 29 U/L DA — SIGNIFICANT CHANGE UP (ref 10–60)
ANION GAP SERPL CALC-SCNC: 10 MMOL/L — SIGNIFICANT CHANGE UP (ref 5–17)
APTT BLD: 33.6 SEC — SIGNIFICANT CHANGE UP (ref 24.5–35.6)
AST SERPL-CCNC: 32 U/L — SIGNIFICANT CHANGE UP (ref 10–40)
BILIRUB SERPL-MCNC: 0.3 MG/DL — SIGNIFICANT CHANGE UP (ref 0.2–1.2)
BLD GP AB SCN SERPL QL: SIGNIFICANT CHANGE UP
BUN SERPL-MCNC: 24 MG/DL — HIGH (ref 7–23)
CALCIUM SERPL-MCNC: 10.4 MG/DL — SIGNIFICANT CHANGE UP (ref 8.4–10.5)
CHLORIDE SERPL-SCNC: 102 MMOL/L — SIGNIFICANT CHANGE UP (ref 96–108)
CO2 SERPL-SCNC: 28 MMOL/L — SIGNIFICANT CHANGE UP (ref 22–31)
CREAT SERPL-MCNC: 0.83 MG/DL — SIGNIFICANT CHANGE UP (ref 0.5–1.3)
EGFR: 75 ML/MIN/1.73M2 — SIGNIFICANT CHANGE UP
GLUCOSE SERPL-MCNC: 170 MG/DL — HIGH (ref 70–99)
INR BLD: 0.97 RATIO — SIGNIFICANT CHANGE UP (ref 0.85–1.18)
POTASSIUM SERPL-MCNC: 5 MMOL/L — SIGNIFICANT CHANGE UP (ref 3.5–5.3)
POTASSIUM SERPL-SCNC: 5 MMOL/L — SIGNIFICANT CHANGE UP (ref 3.5–5.3)
PROT SERPL-MCNC: 8.6 G/DL — HIGH (ref 6–8.3)
PROTHROM AB SERPL-ACNC: 10.9 SEC — SIGNIFICANT CHANGE UP (ref 9.5–13)
SODIUM SERPL-SCNC: 140 MMOL/L — SIGNIFICANT CHANGE UP (ref 135–145)

## 2023-08-15 RX ORDER — DEXTROSE 50 % IN WATER 50 %
25 SYRINGE (ML) INTRAVENOUS ONCE
Refills: 0 | Status: DISCONTINUED | OUTPATIENT
Start: 2023-08-22 | End: 2023-09-05

## 2023-08-15 RX ORDER — DEXTROSE 50 % IN WATER 50 %
12.5 SYRINGE (ML) INTRAVENOUS ONCE
Refills: 0 | Status: DISCONTINUED | OUTPATIENT
Start: 2023-08-22 | End: 2023-09-05

## 2023-08-15 RX ORDER — DEXTROSE 50 % IN WATER 50 %
15 SYRINGE (ML) INTRAVENOUS ONCE
Refills: 0 | Status: DISCONTINUED | OUTPATIENT
Start: 2023-08-22 | End: 2023-09-05

## 2023-08-15 RX ORDER — GABAPENTIN 400 MG/1
1 CAPSULE ORAL
Refills: 0 | DISCHARGE

## 2023-08-15 RX ORDER — GLUCAGON INJECTION, SOLUTION 0.5 MG/.1ML
1 INJECTION, SOLUTION SUBCUTANEOUS ONCE
Refills: 0 | Status: DISCONTINUED | OUTPATIENT
Start: 2023-08-22 | End: 2023-09-05

## 2023-08-15 NOTE — H&P PST ADULT - HISTORY OF PRESENT ILLNESS
72 yo Armenian speaking female is accompanied by her  who is the transla.  She is rescheduled for left lumbar L4-5 microdiscectomy on 8/22/2023.  She reports chronic longstanding lower back pain with radiation to left anterior leg and numbness in her anterior left leg above ankle.    Her pain is unrelieved with prednisone or epidural injections and tylenol with codeine.  Pain rating is 8/10 at worst when she is standing.

## 2023-08-15 NOTE — H&P PST ADULT - PRO INTERPRETER NEED 2
Refill request from pharmacy for the medication listed below.  Patient was last seen 10-21-19.  Next appt  1-20-20  3 mo f/o                      3-30-20  [facility]      Last written as  4-15-19  180 with 1 refill          Pharmacy:  [facility]        Drug Prescription Monitoring    Associated Diagnosis for Medication:  [place] PDMP Reviewed:  Date of Last Urine Drug Screen:  (If + for illegal substances do not fill; if negative for expected opiate do not fill)  Last Dispensed from Pharmacy:  New Refill Start Date Expected:  Next Refill After This Not Until:     Latvian

## 2023-08-15 NOTE — H&P PST ADULT - MUSCULOSKELETAL
details… no calf tenderness/decreased ROM due to pain/back exam/extremities exam/decreased strength/abnormal gait

## 2023-08-15 NOTE — H&P PST ADULT - NSCAFFEAMTFREQ_GEN_ALL_CORE_SD
Spine appears normal, range of motion is not limited, no muscle or joint tenderness 1-2 cups/cans per day

## 2023-08-15 NOTE — H&P PST ADULT - PROBLEM SELECTOR PLAN 1
Left lumbar L4-5 microdiscectomy is planned for 8/22/2023  Diagnostic testing performed  medical clearance was obtained   Instructions reviewed and given in writing- will hold Trulicity for 7 days   Best wishes offered

## 2023-08-21 ENCOUNTER — TRANSCRIPTION ENCOUNTER (OUTPATIENT)
Age: 73
End: 2023-08-21

## 2023-08-21 NOTE — PROVIDER CONTACT NOTE (OTHER) - ASSESSMENT
The Spine Pre-Operative Education packet was given to the patient on 7/26/2023. The patient and I reviewed the information included in the packet. All her questions were answered and she gave a clear understanding of the instructions that were provided. She was advised to call the office at any time with further questions or concerns.

## 2023-08-22 ENCOUNTER — OUTPATIENT (OUTPATIENT)
Dept: OUTPATIENT SERVICES | Facility: HOSPITAL | Age: 73
LOS: 1 days | End: 2023-08-22
Payer: COMMERCIAL

## 2023-08-22 ENCOUNTER — APPOINTMENT (OUTPATIENT)
Dept: ORTHOPEDIC SURGERY | Facility: HOSPITAL | Age: 73
End: 2023-08-22

## 2023-08-22 ENCOUNTER — TRANSCRIPTION ENCOUNTER (OUTPATIENT)
Age: 73
End: 2023-08-22

## 2023-08-22 ENCOUNTER — RESULT REVIEW (OUTPATIENT)
Age: 73
End: 2023-08-22

## 2023-08-22 VITALS
WEIGHT: 125.44 LBS | RESPIRATION RATE: 19 BRPM | TEMPERATURE: 97 F | OXYGEN SATURATION: 98 % | SYSTOLIC BLOOD PRESSURE: 129 MMHG | HEIGHT: 61 IN | HEART RATE: 97 BPM | DIASTOLIC BLOOD PRESSURE: 71 MMHG

## 2023-08-22 VITALS
OXYGEN SATURATION: 93 % | SYSTOLIC BLOOD PRESSURE: 121 MMHG | DIASTOLIC BLOOD PRESSURE: 78 MMHG | RESPIRATION RATE: 22 BRPM | HEART RATE: 97 BPM

## 2023-08-22 DIAGNOSIS — Z98.891 HISTORY OF UTERINE SCAR FROM PREVIOUS SURGERY: Chronic | ICD-10-CM

## 2023-08-22 DIAGNOSIS — Z90.49 ACQUIRED ABSENCE OF OTHER SPECIFIED PARTS OF DIGESTIVE TRACT: Chronic | ICD-10-CM

## 2023-08-22 DIAGNOSIS — M51.26 OTHER INTERVERTEBRAL DISC DISPLACEMENT, LUMBAR REGION: ICD-10-CM

## 2023-08-22 DIAGNOSIS — Z98.890 OTHER SPECIFIED POSTPROCEDURAL STATES: Chronic | ICD-10-CM

## 2023-08-22 LAB — GLUCOSE BLDC GLUCOMTR-MCNC: 146 MG/DL — HIGH (ref 70–99)

## 2023-08-22 PROCEDURE — 82962 GLUCOSE BLOOD TEST: CPT

## 2023-08-22 PROCEDURE — 63030 LAMOT DCMPRN NRV RT 1 LMBR: CPT | Mod: 82,59,LT

## 2023-08-22 PROCEDURE — 88311 DECALCIFY TISSUE: CPT | Mod: 26

## 2023-08-22 PROCEDURE — 76000 FLUOROSCOPY <1 HR PHYS/QHP: CPT

## 2023-08-22 PROCEDURE — 63047 LAM FACETEC & FORAMOT LUMBAR: CPT | Mod: 82,LT

## 2023-08-22 PROCEDURE — 88304 TISSUE EXAM BY PATHOLOGIST: CPT | Mod: 26

## 2023-08-22 PROCEDURE — 63047 LAM FACETEC & FORAMOT LUMBAR: CPT

## 2023-08-22 PROCEDURE — C1889: CPT

## 2023-08-22 PROCEDURE — 88304 TISSUE EXAM BY PATHOLOGIST: CPT

## 2023-08-22 PROCEDURE — 97161 PT EVAL LOW COMPLEX 20 MIN: CPT

## 2023-08-22 PROCEDURE — 63030 LAMOT DCMPRN NRV RT 1 LMBR: CPT | Mod: LT,59

## 2023-08-22 PROCEDURE — 88311 DECALCIFY TISSUE: CPT

## 2023-08-22 DEVICE — SURGIFLO HEMOSTATIC MATRIX KIT: Type: IMPLANTABLE DEVICE | Status: FUNCTIONAL

## 2023-08-22 DEVICE — BONE WAX 2.5GM: Type: IMPLANTABLE DEVICE | Status: FUNCTIONAL

## 2023-08-22 DEVICE — SURGIFOAM PAD 8CM X 12.5CM X 10MM (100): Type: IMPLANTABLE DEVICE | Status: FUNCTIONAL

## 2023-08-22 RX ORDER — ONDANSETRON 8 MG/1
4 TABLET, FILM COATED ORAL ONCE
Refills: 0 | Status: DISCONTINUED | OUTPATIENT
Start: 2023-08-22 | End: 2023-09-05

## 2023-08-22 RX ORDER — HYDROMORPHONE HYDROCHLORIDE 2 MG/ML
0.2 INJECTION INTRAMUSCULAR; INTRAVENOUS; SUBCUTANEOUS
Refills: 0 | Status: DISCONTINUED | OUTPATIENT
Start: 2023-08-22 | End: 2023-08-22

## 2023-08-22 RX ORDER — OXYCODONE HYDROCHLORIDE 5 MG/1
5 TABLET ORAL ONCE
Refills: 0 | Status: DISCONTINUED | OUTPATIENT
Start: 2023-08-22 | End: 2023-08-22

## 2023-08-22 RX ORDER — ACETAMINOPHEN 500 MG
1000 TABLET ORAL ONCE
Refills: 0 | Status: COMPLETED | OUTPATIENT
Start: 2023-08-22 | End: 2023-08-22

## 2023-08-22 RX ORDER — BENZOCAINE AND MENTHOL 5; 1 G/100ML; G/100ML
1 LIQUID ORAL ONCE
Refills: 0 | Status: COMPLETED | OUTPATIENT
Start: 2023-08-22 | End: 2023-08-22

## 2023-08-22 RX ORDER — APREPITANT 80 MG/1
40 CAPSULE ORAL ONCE
Refills: 0 | Status: COMPLETED | OUTPATIENT
Start: 2023-08-22 | End: 2023-08-22

## 2023-08-22 RX ORDER — CEFAZOLIN SODIUM 1 G
2000 VIAL (EA) INJECTION ONCE
Refills: 0 | Status: COMPLETED | OUTPATIENT
Start: 2023-08-22 | End: 2023-08-22

## 2023-08-22 RX ORDER — TIZANIDINE 4 MG/1
2 TABLET ORAL
Qty: 30 | Refills: 0
Start: 2023-08-22

## 2023-08-22 RX ORDER — HYDROMORPHONE HYDROCHLORIDE 2 MG/ML
0.5 INJECTION INTRAMUSCULAR; INTRAVENOUS; SUBCUTANEOUS
Refills: 0 | Status: DISCONTINUED | OUTPATIENT
Start: 2023-08-22 | End: 2023-08-22

## 2023-08-22 RX ORDER — OXYCODONE HYDROCHLORIDE 5 MG/1
1 TABLET ORAL
Qty: 42 | Refills: 0
Start: 2023-08-22

## 2023-08-22 RX ORDER — SODIUM CHLORIDE 9 MG/ML
1000 INJECTION, SOLUTION INTRAVENOUS
Refills: 0 | Status: DISCONTINUED | OUTPATIENT
Start: 2023-08-22 | End: 2023-09-05

## 2023-08-22 RX ADMIN — BENZOCAINE AND MENTHOL 1 LOZENGE: 5; 1 LIQUID ORAL at 14:13

## 2023-08-22 RX ADMIN — APREPITANT 40 MILLIGRAM(S): 80 CAPSULE ORAL at 10:26

## 2023-08-22 NOTE — ASU DISCHARGE PLAN (ADULT/PEDIATRIC) - ASU DC SPECIAL INSTRUCTIONSFT
You have just had spine surgery.  Please take care of your back by adhering to some basic recommendations.    Your surgeon recommends taking acetaminophen (tylenol) 1000mg 3 times per day for the next 14 to 21 days.  Apply icepacks to the surgical area to reduce swelling and discomfort.  This can help to minimize the need for stronger medications.    You can obtain a back brace using the provided prescription from an orthotist or surgical supply store.     No heavy lifting. Do not lift more than 10 pounds.  Avoid twisting and bending over.  Do NOT drive a car.  You may be driven in a car.    You may shower if the dressing is the clear plastic type or if you cover the existing dressing with plastic and tape to prevent it from getting wet.  Change dressing with dry, sterile gauze and tape if it becomes loose, wet or soiled.    You nerve function was monitored during surgery through the use of small needles and stick-on electrodes.  You may find some minor bruising/blood spots, some small needle punctures and have some minor muscular soreness because of this.  There is no intervention necessary for these issues.        Observe low back precautions as described by the Physical Therapist.  Walking is your best exercise.  It is best not to remain in one position for long periods such as long car rides.    Constipation is a common problem associated with surgery and pain medications.  You should ensure that you are drinking plenty of fluids and increasing your fruit and vegetable intake.  You are advised to take Docusate 100mg three times per day to prevent opioid induced constipation.  To treat opioid induced constipation use Senna (Senokot) or Bisacodyl (Dulcolax) or PEG 3350 (Miralax) every evening until your first bowel movement and then every evening as needed.  To treat opioid induced bloating and gas pain use Simethicone (Gas-X) 80 mg per label directions.     Smoking should be avoided.  Tobacco products are associated with back problems.       Call the doctor with questions, fevers, severe headache, bowel or bladder dysfunction, new numbness/weakness or new pain not relieved by medication, ice pack and change of position.    You should see your surgeon for follow up within 14 days of surgery.

## 2023-08-22 NOTE — ASU PATIENT PROFILE, ADULT - NSICDXPASTSURGICALHX_GEN_ALL_CORE_FT
PAST SURGICAL HISTORY:  S/P  section     S/P  section     S/P  section     S/P laparoscopic cholecystectomy      PAST SURGICAL HISTORY:  S/P bilateral breast reduction     S/P  section     S/P  section     S/P  section     S/P laparoscopic cholecystectomy

## 2023-08-22 NOTE — ASU PATIENT PROFILE, ADULT - NSICDXPASTMEDICALHX_GEN_ALL_CORE_FT
PAST MEDICAL HISTORY:  Anxiety and depression     Cervicalgia     Disc disorder of lumbar region     Hyperlipidemia     Hypertension     Interstitial lung disease     Malignant neoplasm of skin     Type 2 diabetes mellitus      PAST MEDICAL HISTORY:  Anxiety and depression     Cervicalgia     Disc disorder of lumbar region     Hyperlipidemia     Hypertension     Interstitial lung disease fibrosis    Malignant neoplasm of skin     Type 2 diabetes mellitus

## 2023-08-22 NOTE — BRIEF OPERATIVE NOTE - CONDITION POST OP
After obtaining consent and per verbal order from Dr. Enrique Ponce, patient received influenza vaccine given by Wordymiguel angel Appota and verified by Fabiana Medellin. Fluad Influenza 0.5ml was given IM in right deltoid. Patient tolerated injection and was observed for 10 minutes post injection.  VIS was given.' Stable

## 2023-08-22 NOTE — BRIEF OPERATIVE NOTE - NSICDXBRIEFPROCEDURE_GEN_ALL_CORE_FT
PROCEDURES:  Hemilaminectomy, spine, lumbar 22-Aug-2023 13:14:05 L4-5 Left Paco Wolf  Discectomy, spine, lumbar 22-Aug-2023 13:14:27 L4-5 Left Paco Wolf

## 2023-08-22 NOTE — ASU DISCHARGE PLAN (ADULT/PEDIATRIC) - CARE PROVIDER_API CALL
Zenon Brown [facility]  Spine Surgery  [address]  [place] [address]  Phone: [phone]  Fax: [phone]  Follow Up Time: 2 weeks

## 2023-08-22 NOTE — ASU DISCHARGE PLAN (ADULT/PEDIATRIC) - NS MD DC FALL RISK RISK
For information on Fall & Injury Prevention, visit: [email or web address] OR  [email or web address] OR  https://www.cdc.gov/steadi/patient.html

## 2023-08-22 NOTE — ASU PATIENT PROFILE, ADULT - PATIENT KNOW
Alert and oriented to person, place, time/situation. normal mood and affect. no apparent risk to self or others.
yes

## 2023-08-23 RX ORDER — ACETAMINOPHEN AND CODEINE PHOSPHATE 300; 30 MG/1; MG/1
300-30 TABLET ORAL
Qty: 30 | Refills: 0 | Status: ACTIVE | OUTPATIENT
Start: 2023-06-23

## 2023-08-28 NOTE — REASON FOR VISIT
[Follow-Up Visit] : a follow-up visit for [Radiculopathy] : radiculopathy [Family Member] : family member

## 2023-08-28 NOTE — PHYSICAL EXAM
[Antalgic] : antalgic [Cane] : ambulates with cane [SLR] : positive straight leg raise [LE] : 5/5 motor strength in bilateral lower extremities [] : Sensory: [L4-LT] : L4 [L5-LT] : L5 [0] : left ankle jerk 0 [Plantar Reflex Right Only] : absent on the right [Plantar Reflex Left Only] : absent on the left [DTR Reflexes Clonus Of Left Ankle (___ Beats)] : absent on the left [DTR Reflexes Clonus Of Right Ankle (___ Beats)] : absent on the right [DP] : dorsalis pedis 2+ and symmetric bilaterally [de-identified] : seen with her daughter  The pt is awake, alert and oriented to self, place and time, is comfortable and in no acute distress. Inspection of neck, back and lower extremities bilaterally reveals no rashes or ecchymotic lesions. There is no tenderness over the cervical, thoracic or lumbar spine, or the paraspinal or upper and lower extremities musculature. There is no sacroiliac tenderness. No greater trochanteric tenderness bilaterally. No atrophy or abnormal movements noted in the upper or lower extremities. There is no swelling noted in the upper or lower extremities bilaterally. No cervical lymphadenopathy noted anteriorly. No joint laxity noted in the upper and lower extremity joints bilaterally. Hip range of motion is degrees internal rotation 30 external rotation without pain. Full range of motion of the shoulders bilaterally with no significant pain There is no groin pain with hip internal rotation and a negative LOI test bilaterally.  [de-identified] : ext 30 degrees with pain

## 2023-08-28 NOTE — DISCUSSION/SUMMARY
[Medication Risks Reviewed] : Medication risks reviewed [de-identified] : I spoke at length with the patient and her daughter about her proposed surgery which was a decompression with hemilaminectomy left L4-5.  In this setting surgical decompression is indicated given the progressive nature of her symptoms and increasing numbness down her left leg.  The patient understands that her hemoglobin is a marker of glycemic control and her A1c level is over 10 suggesting poor glycemic control over the previous few months.  She understands that this may not be acutely changed by any interventions and given progressive neurologic symptoms we should try and focus on surgical decompression as soon as possible once her medical team and her family on board.  The patient was educated regarding their condition, treatment options as well as prescribed course of treatment.  Risks and benefits as well as alternatives to the proposed treatment were also provided to the patient  They were given the opportunity to have all their questions answered to their satisfaction.  Vital signs were reviewed with the patient and the patient was instructed to followup with their primary care provider for further management. There were no PAs or scribes used in the evaluation, exam or treatment plan discussion. The surgeon was the primary evaluating or treating physician as noted above.  The patient was advised that based upon their clinical presentation and radiographic findings they meet inclusion criteria for spinal surgery to address their spinal pathology. Specifically, they have tried medications, therapy and/or injections without relief of symptoms. Given the duration of symptoms and failure to respond to non surgical treatments surgery was offered to the patient and they have decided to proceed with surgery.  The spectrum of treatments for their spinal condition were reviewed in detail. The goals, alternatives, risks and benefits of spinal surgery were reviewed in detail with the patient.    Benefits and risks of operative and nonoperative treatment for the patient's pathology were outlined at length with the patient.  Specifically, those risks are understood to be, but not limited to, bleeding, infection, fracture (both during surgery and after surgery), adjacent level disease, failure to heal (significantly increased by smoking), need for further surgery, failure of instrumentation (if used), recurrence of problem and symptoms, neurovascular injury, dural tears or leaks resulting in spinal fluid leakage and requiring additional invasive and non-invasive treatments, need for additional procedures, possible paralysis resulting in loss of use of arms, legs, bowel and bladder function as well as sexual dysfunction, and anesthetic risks including death.  Available alternatives to surgery were also discussed with the patient. In addition, the patient further understands that there may be indicated need for somatosensory evoked potential monitoring, real-time EMG monitoring, and motor evoked potential monitoring during the procedure along with placement of needle electrodes. A neuromonitoring service will discuss the risks and benefits separately with the patient. The patient also understands that having a surgical procedure and being hospitalized carries risks in addition to the ones described above.  The patient was advised that Dr. Brown operates as a surgical team with his associate Dr. Cm and/or with surgical Physician Assistants (PA)  The patient was advised that they will require a medical preoperative risk evaluation by their PCP. Further medical subspecialty clearances such as cardiac may be indicated if felt needed by their PCP.  The patient was advised he/she may call our office after careful consideration of their treatment options and further consultation with any other physician to begin the process of preoperative planning for elective spinal surgery at a time of their choosing.  The patient verbalized an understanding of the procedure, its indications, and its common potential complications and attendant risks, and is willing to proceed. No guarantees were made with regard to a complete recovery. We will proceed in a timely manner after obtaining medical clearance.

## 2023-08-28 NOTE — HISTORY OF PRESENT ILLNESS
[de-identified] : Patient is seen for follow-up.  She reports progressive numbness down her left leg with standing especially below her knee.  Her surgery scheduled on the left side at L4-5 was canceled due to her elevated hemoglobin A1c.  She also had a slightly elevated white blood cell count.  She is currently taking Tylenol.  She has been taking oral prednisone  At this time the family is concerned about the progressive nature of her symptoms but at the same time worried about her comorbidities affecting the outcome of surgery. [Worsening] : worsening [8] : a current pain level of 8/10

## 2023-09-06 ENCOUNTER — APPOINTMENT (OUTPATIENT)
Dept: ORTHOPEDIC SURGERY | Facility: CLINIC | Age: 73
End: 2023-09-06
Payer: COMMERCIAL

## 2023-09-06 VITALS — HEART RATE: 102 BPM | DIASTOLIC BLOOD PRESSURE: 77 MMHG | SYSTOLIC BLOOD PRESSURE: 128 MMHG

## 2023-09-06 DIAGNOSIS — Z98.890 OTHER SPECIFIED POSTPROCEDURAL STATES: ICD-10-CM

## 2023-09-06 PROCEDURE — 99024 POSTOP FOLLOW-UP VISIT: CPT

## 2023-09-06 NOTE — HISTORY OF PRESENT ILLNESS
[3] : the patient reports pain that is 3/10 in severity [Clean/Dry/Intact] : clean, dry and intact [Vascular Intact] : ~T peripheral vascular exam normal [Negative Anayeli's] : maneuvers demonstrated a negative Anayeli's sign [Doing Well] : is doing well [Excellent Pain Control] : has excellent pain control [No Sign of Infection] : is showing no signs of infection [Erythema] : not erythematous [Discharge] : absent of discharge [Swelling] : not swollen [Dehiscence] : not dehisced [de-identified] : s/p microdiscectomy and hemilaminectomy left L4-5 8/22/23 [de-identified] : Patient reports some pain to low back however is doing much better since surgery. Getting home PT Switched pain meds to T#3  [de-identified] : seen with her daughter over telephone 5/5 EHL, ankle DF, PF Negative straight leg raise bilaterally. [de-identified] : f/u in 4 weeks continue lumbar corset for 4 weeks may d/c home PT and will start outpatient PT in 4 weeks after follow-up evaluation.  Encouraged her to walk but minimize bending twisting lifting activities at this time.  Overall the patient is pleased with the outcome of surgery with no new complaints and significant improvement of her preoperative left leg numbness and pain.  May wean off her pain medications as well.

## 2023-09-07 PROBLEM — J84.9 INTERSTITIAL PULMONARY DISEASE, UNSPECIFIED: Chronic | Status: ACTIVE | Noted: 2023-07-05

## 2023-09-15 ENCOUNTER — EMERGENCY (EMERGENCY)
Facility: HOSPITAL | Age: 73
LOS: 1 days | Discharge: HOME | End: 2023-09-15
Attending: STUDENT IN AN ORGANIZED HEALTH CARE EDUCATION/TRAINING PROGRAM | Admitting: STUDENT IN AN ORGANIZED HEALTH CARE EDUCATION/TRAINING PROGRAM
Payer: MEDICAID

## 2023-09-15 VITALS
OXYGEN SATURATION: 95 % | DIASTOLIC BLOOD PRESSURE: 76 MMHG | SYSTOLIC BLOOD PRESSURE: 118 MMHG | HEART RATE: 104 BPM | WEIGHT: 125.66 LBS | HEIGHT: 61 IN | TEMPERATURE: 98 F | RESPIRATION RATE: 20 BRPM

## 2023-09-15 DIAGNOSIS — Z90.49 ACQUIRED ABSENCE OF OTHER SPECIFIED PARTS OF DIGESTIVE TRACT: Chronic | ICD-10-CM

## 2023-09-15 DIAGNOSIS — Z98.891 HISTORY OF UTERINE SCAR FROM PREVIOUS SURGERY: Chronic | ICD-10-CM

## 2023-09-15 DIAGNOSIS — Z98.890 OTHER SPECIFIED POSTPROCEDURAL STATES: Chronic | ICD-10-CM

## 2023-09-15 PROCEDURE — 99284 EMERGENCY DEPT VISIT MOD MDM: CPT | Mod: 25

## 2023-09-15 PROCEDURE — 12002 RPR S/N/AX/GEN/TRNK2.6-7.5CM: CPT

## 2023-09-15 PROCEDURE — 73130 X-RAY EXAM OF HAND: CPT

## 2023-09-15 PROCEDURE — 99283 EMERGENCY DEPT VISIT LOW MDM: CPT

## 2023-09-15 PROCEDURE — 73130 X-RAY EXAM OF HAND: CPT | Mod: 26,RT

## 2023-09-15 RX ORDER — DULAGLUTIDE 4.5 MG/.5ML
1.5 INJECTION, SOLUTION SUBCUTANEOUS
Refills: 0 | DISCHARGE

## 2023-09-15 RX ORDER — PIOGLITAZONE HYDROCHLORIDE 15 MG/1
1 TABLET ORAL
Refills: 0 | DISCHARGE

## 2023-09-15 RX ORDER — QUETIAPINE FUMARATE 200 MG/1
1 TABLET, FILM COATED ORAL
Refills: 0 | DISCHARGE

## 2023-09-15 RX ORDER — SERTRALINE 25 MG/1
1 TABLET, FILM COATED ORAL
Refills: 0 | DISCHARGE

## 2023-09-15 RX ORDER — GABAPENTIN 400 MG/1
2 CAPSULE ORAL
Refills: 0 | DISCHARGE

## 2023-09-15 RX ORDER — ATORVASTATIN CALCIUM 80 MG/1
1 TABLET, FILM COATED ORAL
Refills: 0 | DISCHARGE

## 2023-09-15 RX ORDER — AMLODIPINE BESYLATE 2.5 MG/1
1 TABLET ORAL
Refills: 0 | DISCHARGE

## 2023-09-15 RX ORDER — METFORMIN HYDROCHLORIDE 850 MG/1
1 TABLET ORAL
Refills: 0 | DISCHARGE

## 2023-09-15 RX ORDER — ACETAMINOPHEN 500 MG
2 TABLET ORAL
Qty: 0 | Refills: 0 | DISCHARGE

## 2023-09-15 NOTE — ED PROVIDER NOTE - CARE PROVIDER_API CALL
Reginald Bardales  Plastic Surgery  [address] [place] [address], [address] [place] [id number]  Phone: [phone]  Fax: [phone]  Follow Up Time:

## 2023-09-15 NOTE — ED ADULT NURSE NOTE - OBJECTIVE STATEMENT
72 yo male presents to the ED with complaints of laceration to the  R hand 72 yo female presents to the ED with complaints of laceration to the  R hand , pt daughter reports they went to [facility] who recommended hand surgeon to do lac repair as pt muscle  is exposed , pt noted to be bleeding from the 3rd -4th finger.

## 2023-09-15 NOTE — ED ADULT NURSE NOTE - NSICDXPASTSURGICALHX_GEN_ALL_CORE_FT
PAST SURGICAL HISTORY:  S/P bilateral breast reduction     S/P  section     S/P  section     S/P  section     S/P laparoscopic cholecystectomy

## 2023-09-15 NOTE — ED PROVIDER NOTE - PROGRESS NOTE DETAILS
Patient tolerated lac repair well.  Bacitracin applied, sterile dressing.  Wound care instructions given to daughter and hand follow up.

## 2023-09-15 NOTE — ED ADULT NURSE NOTE - NSICDXPASTMEDICALHX_GEN_ALL_CORE_FT
PAST MEDICAL HISTORY:  Anxiety and depression     Cervicalgia     Disc disorder of lumbar region     Hyperlipidemia     Hypertension     Interstitial lung disease fibrosis    Malignant neoplasm of skin     Type 2 diabetes mellitus

## 2023-09-15 NOTE — ED PROVIDER NOTE - PATIENT PORTAL LINK FT
You can access the FollowMyHealth Patient Portal offered by [facility] by registering at the following website: [email or web address]. By joining [employer]’s FollowMyHealth portal, you will also be able to view your health information using other applications (apps) compatible with our system.

## 2023-09-15 NOTE — ED PROVIDER NOTE - SKIN, MLM
Skin normal color for race, warm, dry and intact. No evidence of rash.  4cm laceration in between the webspace of 3rd and 4th digits of right hand.  Full ROM of 3rd and 4th fingers.  No tendon laceration noted after irrigation.  Normal sensation to right palm and fingers.  Cap refill < 2 seconds, +radial pulse

## 2023-09-15 NOTE — ED ADULT NURSE NOTE - NSFALLRISKINTERV_ED_ALL_ED

## 2023-09-15 NOTE — ED PROVIDER NOTE - CLINICAL SUMMARY MEDICAL DECISION MAKING FREE TEXT BOX
73 year old female p/w laceration to her right hand s/p mechanical fall at home.  Sent from urgent care for repair.  Neurovascularly intact.  Tetanus UTD,  Irrigate, anesthetize, suture repair, hand follow up

## 2023-09-15 NOTE — ED PROVIDER NOTE - NSFOLLOWUPINSTRUCTIONS_ED_ALL_ED_FT
Please keep the wound dry for 24 hours.  Apply Bacitracin twice a day and keep the wound covered.  Have your sutured removed in 7-9 days.  Return to the ER for persistent pain, swelling, fever, redness, streaking, wound discharge, numbness, or any other concerns.     Laceration Care, Adult    A laceration is a cut that may go through all layers of the skin. The cut may also go into the tissue that is right under the skin. Some cuts heal on their own. Others need to be closed with stitches (sutures), staples, skin adhesive strips, or skin glue. Taking care of your injury lowers your risk of infection, helps your injury to heal better, and may prevent scarring.    Supplies needed:  Soap.  Water.  Hand .  Bandage (dressing).  Antibiotic ointment.  Clean towel.    How to take care of your cut  Wash your hands with soap and water before touching your wound or changing your bandage. If soap and water are not available, use hand .        If your doctor used stitches or staples:    Keep the wound clean and dry.  If you were given a bandage, change it at least once a day as told by your doctor. You should also change it if it gets wet or dirty.  Keep the wound completely dry for the first 24 hours, or as told by your doctor. After that, you may take a shower or a bath. Do not get the wound soaked in water until after the stitches or staples have been removed.  Clean the wound once a day, or as told by your doctor:    Wash the wound with soap and water.  Rinse the wound with water to remove all soap.  Pat the wound dry with a clean towel. Do not rub the wound.  After you clean the wound, put a thin layer of antibiotic ointment on it as told by your doctor. This ointment:    Helps to prevent infection.  Keeps the bandage from sticking to the wound.  Have your stitches or staples removed as told by your doctor.        If your doctor used skin adhesive strips:    Keep the wound clean and dry.  If you were given a bandage, you should change it at least once a day as told by your doctor. You should also change it if it gets wet or dirty.  Do not get the skin adhesive strips wet. You can take a shower or a bath, but keep the wound dry.  If the wound gets wet, pat it dry with a clean towel. Do not rub the wound.  Skin adhesive strips fall off on their own. You can trim the strips as the wound heals. Do not remove any strips that are still stuck to the wound. They will fall off after a while.        If your doctor used skin glue:    Try to keep your wound dry, but you may briefly wet it in the shower or bath. Do not soak the wound in water, such as by swimming.  After you take a shower or a bath, gently pat the wound dry with a clean towel. Do not rub the wound.  Do not do any activities that will make you really sweaty until the skin glue has fallen off on its own.  Do not apply liquid, cream, or ointment medicine to your wound while the skin glue is still on.  If you were given a bandage, you should change it at least once a day or as told by your doctor. You should also change it if it gets dirty or wet.  If a bandage is placed over the wound, do not let the tape touch the skin glue.  Do not pick at the glue. The skin glue usually stays on for 5–10 days. Then, it falls off the skin.        General instructions     Take over-the-counter and prescription medicines only as told by your doctor.  If you were given antibiotic medicine or ointment, take or apply it as told by your doctor. Do not stop using it even if your condition improves.  Do not scratch or pick at the wound.  Check your wound every day for signs of infection. Watch for:    Redness, swelling, or pain.  Fluid, blood, or pus.  Raise (elevate) the injured area above the level of your heart while you are sitting or lying down.  If directed, put ice on the affected area:    Put ice in a plastic bag.  Place a towel between your skin and the bag.  Leave the ice on for 20 minutes, 2–3 times a day.  Prevent scarring by covering your wound with sunscreen of at least 30 SPF whenever you are outside after your wound has healed.  Keep all follow-up visits as told by your doctor. This is important.    Get help if:  You got a tetanus shot and you have any of these problems at the injection site:    Swelling.  Very bad pain.  Redness.  Bleeding.  You have a fever.  A wound that was closed breaks open.  You notice a bad smell coming from your wound or your bandage.  You notice something coming out of the wound, such as wood or glass.  Medicine does not relieve your pain.  You have more redness, swelling, or pain at the site of your wound.  You have fluid, blood, or pus coming from your wound.  You notice a change in the color of your skin near your wound.  You need to change the bandage often because fluid, blood, or pus is coming from the wound.  You start to have a new rash.  You start to have numbness around the wound.    Get help right away if:  You have very bad swelling around the wound.  Your pain suddenly gets worse and is very bad.  You notice painful lumps near the wound or anywhere on your body.  You have a red streak going away from your wound.  The wound is on your hand or foot, and:    You cannot move a finger or toe.  Your fingers or toes look pale or bluish.    Summary  A laceration is a cut that may go through all layers of the skin. The cut may also go into the tissue right under the skin.  Some cuts heal on their own. Others need to be closed with stitches, staples, skin adhesive strips, or skin glue.  Follow your doctor's instructions for caring for your cut. Proper care of a cut lowers the risk of infection, helps the cut heal better, and prevents scarring.    ADDITIONAL NOTES AND INSTRUCTIONS    Please follow up with your Primary MD in 24-48 hr.  Seek immediate medical care for any new/worsening signs or symptoms.

## 2023-09-15 NOTE — ED PROVIDER NOTE - OBJECTIVE STATEMENT
73 year old female with a history of HTN, HLD, DM, interstitial lung disease, lumbar disc disease, anxiety, depression p/w laceration to her right hand.  Patient is Romanian-speaking, translation provided by her daughter at the bedside.  Patient was cooking and slipped on water that had fallen onto the marble floor of her kitchen.  She put out her right hand to break her fall and sustained a laceration in between the 3rd and 4th fingers of her right hand.  Denies hitting her head, LOC, or any other injuries.  Tetanus UTD.  She is not taking any anticoagulation.  Patient was seen at urgent care but was advised to come to ED for hand specialist to repair the laceration due to the depth of the wound.  Patient denies numbness/tingling to her fingers.  She received 2 Tylenol at urgent care.  CLAUDE Concepcion

## 2023-09-16 VITALS
OXYGEN SATURATION: 98 % | HEART RATE: 77 BPM | SYSTOLIC BLOOD PRESSURE: 140 MMHG | DIASTOLIC BLOOD PRESSURE: 80 MMHG | TEMPERATURE: 98 F | RESPIRATION RATE: 18 BRPM

## 2023-10-04 ENCOUNTER — APPOINTMENT (OUTPATIENT)
Dept: ORTHOPEDIC SURGERY | Facility: CLINIC | Age: 73
End: 2023-10-04
Payer: COMMERCIAL

## 2023-10-04 DIAGNOSIS — Z98.890 OTHER SPECIFIED POSTPROCEDURAL STATES: ICD-10-CM

## 2023-10-04 DIAGNOSIS — M43.10 SPONDYLOLISTHESIS, SITE UNSPECIFIED: ICD-10-CM

## 2023-10-04 PROCEDURE — 99213 OFFICE O/P EST LOW 20 MIN: CPT | Mod: 24,25

## 2023-10-04 PROCEDURE — 72100 X-RAY EXAM L-S SPINE 2/3 VWS: CPT

## 2023-10-06 ENCOUNTER — APPOINTMENT (OUTPATIENT)
Dept: ORTHOPEDIC SURGERY | Facility: CLINIC | Age: 73
End: 2023-10-06
Payer: COMMERCIAL

## 2023-10-06 DIAGNOSIS — S61.411A LACERATION W/OUT FOREIGN BODY OF RIGHT HAND, INITIAL ENCOUNTER: ICD-10-CM

## 2023-10-06 PROCEDURE — 99214 OFFICE O/P EST MOD 30 MIN: CPT | Mod: 24

## 2024-03-21 NOTE — ASU PATIENT PROFILE, ADULT - TOBACCO USE
Occupational Therapy  Facility/Department: [address]  Occupational Therapy Daily Treatment Note    Name: Jareth Paulino  : 1943  MRN: [id number]  Date of Service: 3/21/2024    Discharge Recommendations:  Patient would benefit from continued therapy after discharge  OT Equipment Recommendations  Other: TBD       Patient Diagnosis(es): The primary encounter diagnosis was TIERNEY (acute kidney injury) ([facility]). A diagnosis of Elevated troponin was also pertinent to this visit.  Past Medical History:  has a past medical history of A-fib (HCC), Anxiety, Arrhythmia, CHF (congestive heart failure) ([facility]), Depression, Hyperlipidemia, Hypertension, Obesity, Pleural effusion, Pleural effusion on left, Recurrent pleural effusion, and Status post total hip replacement, left.  Past Surgical History:  has a past surgical history that includes joint replacement (Left); Tonsillectomy; and Atrial ablation surgery ().           Assessment   Performance deficits / Impairments: Decreased ADL status;Decreased functional mobility ;Decreased balance  Assessment: Patient progressing towards STGs. Patient continues to demonstrate above deficits. Patient would benefit from skilled OT services addressing above deficits while here at hospital to maximize independence and safety. Patient currently unsafe to return to prior living arrangements at this time.  Prognosis: Good  REQUIRES OT FOLLOW-UP: Yes  Activity Tolerance  Activity Tolerance: Patient limited by pain;Patient limited by fatigue  Activity Tolerance Comments: increased SOB and anxiety with all transfers/standing, educated on breathing techniques        Plan   Occupational Therapy Plan  Times Per Week: 5-6x/wk  Current Treatment Recommendations: Balance training, Functional mobility training, Safety education & training, Patient/Caregiver education & training, Equipment evaluation, education, & procurement, Self-Care / ADL  Never smoker

## 2024-04-22 ENCOUNTER — APPOINTMENT (OUTPATIENT)
Dept: NEUROLOGY | Facility: CLINIC | Age: 74
End: 2024-04-22

## 2024-05-08 ENCOUNTER — APPOINTMENT (OUTPATIENT)
Dept: ENDOCRINOLOGY | Facility: CLINIC | Age: 74
End: 2024-05-08
Payer: MEDICARE

## 2024-05-08 VITALS
HEART RATE: 95 BPM | BODY MASS INDEX: 22.86 KG/M2 | WEIGHT: 125 LBS | DIASTOLIC BLOOD PRESSURE: 83 MMHG | SYSTOLIC BLOOD PRESSURE: 145 MMHG

## 2024-05-08 LAB
GLUCOSE BLDC GLUCOMTR-MCNC: 136
HBA1C MFR BLD HPLC: 6.2

## 2024-05-08 PROCEDURE — 83036 HEMOGLOBIN GLYCOSYLATED A1C: CPT | Mod: QW

## 2024-05-08 PROCEDURE — 82962 GLUCOSE BLOOD TEST: CPT

## 2024-05-08 PROCEDURE — 99205 OFFICE O/P NEW HI 60 MIN: CPT | Mod: 25

## 2024-05-08 PROCEDURE — 36415 COLL VENOUS BLD VENIPUNCTURE: CPT

## 2024-05-08 RX ORDER — DULAGLUTIDE 1.5 MG/.5ML
1.5 INJECTION, SOLUTION SUBCUTANEOUS
Qty: 1 | Refills: 3 | Status: ACTIVE | COMMUNITY
Start: 2024-05-08 | End: 1900-01-01

## 2024-05-08 NOTE — PHYSICAL EXAM
[No Acute Distress] : no acute distress [Normal Sclera/Conjunctiva] : normal sclera/conjunctiva [Normal Outer Ear/Nose] : the ears and nose were normal in appearance [Thyroid Not Enlarged] : the thyroid was not enlarged [No Thyroid Nodules] : no palpable thyroid nodules [Clear to Auscultation] : lungs were clear to auscultation bilaterally [Normal Rate] : heart rate was normal [No Edema] : no peripheral edema [Soft] : abdomen soft [Spine Straight] : spine straight [No Stigmata of Cushings Syndrome] : no stigmata of Cushings Syndrome [Normal Gait] : normal gait [Normal Strength/Tone] : muscle strength and tone were normal [No Rash] : no rash [Normal Reflexes] : deep tendon reflexes were 2+ and symmetric [No Tremors] : no tremors [Oriented x3] : oriented to person, place, and time [Kyphosis] : no kyphosis present

## 2024-05-08 NOTE — REASON FOR VISIT
[Initial Evaluation] : an initial evaluation [DM Type 2] : DM Type 2 [Other: _____] : [unfilled] [FreeTextEntry2] :  Nicholas Concepcion MD, FAX [phone]

## 2024-05-08 NOTE — END OF VISIT
I received sign-out on this patient.  She is a 52-year-old female with a sore throat, seen at outside hospital yesterday.  Her airway is patent but there is some voice change.  There is no brawny submandibular edema to suggest Gian's angina, no evidence of airway compromise.    Her labs are unremarkable.  An x-ray of the soft tissue of the neck does not demonstrate any retropharyngeal space enlargement, does not demonstrate epiglottitis.    She received IV fluids and antibiotics in the previous physician, and will go home on a course of antibiotics.   [FreeTextEntry3] :  All medical record entries made by the Scribe were at my, Dr. Cheng Peñaloza, direction and personally dictated by me on 05/08/2024. I have reviewed the chart and agree that the record accurately reflects my personal performance of the history, physical exam, assessment and plan. I have also personally directed, reviewed and agreed with the chart. [Time Spent: ___ minutes] : I have spent [unfilled] minutes of time on the encounter.

## 2024-05-08 NOTE — HISTORY OF PRESENT ILLNESS
[FreeTextEntry1] : 74 year old F pt, with Hx of T2DM (dx. before ), referred by NICHOLAS BASURTO, presents today to establish endocrine care.  Other PMHx: Minor lung fibrosis (2 years ago, Prednisolone 4 mg), No PMHx of: CAD, neuropathy, renal disorders, retinopathy, bone fractures PSHx:  Section, Gallbladder Surgery FHx: HTN, arthritis SHx: No EtOH, no smoking Drug allergy: Olmesartan Medoxomil  Vaccinated for flu, covid, and pneumonia.   2024  Pt presents today to establish endocrine care for her T2DM. She is accompanied by her daughter, who is translating by phone. CC: "I'm doing fine." Pt's daughter reports that she was previously managed by Dr. Khan and has taken Trulicity for many years. She was referred by her PCP Dr. Nicholas Basurto, who recommended that she follow up with an endocrinologist to properly manage her glucose levels. Pt's daughter reports that 3 months ago, her glucose levels were in the 200s and 300s, but more recently, FBS have been in the 120s. Pt last followed up with ophthalmologist last year which was unremarkable, but did not visit a podiatrist. Pt endorses nocturia 5x.  24 POCT A1c 6.2%  [Medications verified as per pt on 2024] Current Medications: Trulicity 0.75 mg qw, Pioglitazone 30 mg qd, Prednisone 5 m qd, Gabapentin 300 mg qid, Atorvastatin 20 mg qd, Amlodipine 10 mg qd, Sertraline HCl 100 mg qd, Quetiapine 100 mg qd, vitamin D3 2000 IU, Magnesium L-threonate 144 mg, vitamin C HELD: Metformin 1 g bid (held on 2024) Medication modified/added this visit: Hold Metformin, increase Trulicity to 1.5 mg qw

## 2024-05-08 NOTE — CONSULT LETTER
[Dear  ___] : Dear  [unfilled], [Consult Letter:] : I had the pleasure of evaluating your patient, [unfilled]. [Sincerely,] : Sincerely, [FreeTextEntry3] : Cheng Peñaloza

## 2024-05-08 NOTE — ADDENDUM
[FreeTextEntry1] : I, Carter You act solely as a scribe for Dr. Cheng Peñaloza on this date 05/08/2024

## 2024-05-09 ENCOUNTER — APPOINTMENT (OUTPATIENT)
Dept: ENDOCRINOLOGY | Facility: CLINIC | Age: 74
End: 2024-05-09

## 2024-05-21 ENCOUNTER — APPOINTMENT (OUTPATIENT)
Dept: ENDOCRINOLOGY | Facility: CLINIC | Age: 74
End: 2024-05-21

## 2024-05-24 ENCOUNTER — APPOINTMENT (OUTPATIENT)
Dept: ENDOCRINOLOGY | Facility: CLINIC | Age: 74
End: 2024-05-24
Payer: MEDICARE

## 2024-05-24 PROCEDURE — 99447 NTRPROF PH1/NTRNET/EHR 11-20: CPT

## 2024-05-27 NOTE — HISTORY OF PRESENT ILLNESS
[Home] : at home, [unfilled] , at the time of the visit. [Medical Office: ([facility])___] : at the medical office located in  [FreeTextEntry1] : 74 year old F pt, with Hx of T2DM (dx. before ), referred by NICHOLAS BASURTO, presents today to establish endocrine care.  Other PMHx: HTN, Minor lung fibrosis (2 years ago, Prednisolone 4 mg), No PMHx of: CAD, neuropathy, renal disorders, retinopathy, bone fractures PSHx:  Section, Gallbladder Surgery FHx: HTN, arthritis SHx: No EtOH, no smoking Drug allergy: Olmesartan Medoxomil  Vaccinated for flu, covid, and pneumonia.   2024  Pt presents today to establish endocrine care for her T2DM. She is accompanied by her daughter, who is translating by phone. CC: "I'm doing fine." Pt's daughter reports that she was previously managed by Dr. Khan and has taken Trulicity for many years. She was referred by her PCP Dr. Nicholas Basurto, who recommended that she follow up with an endocrinologist to properly manage her glucose levels. Pt's daughter reports that 3 months ago, her glucose levels were in the 200s and 300s, but more recently, FBS have been in the 120s. Pt last followed up with ophthalmologist last year which was unremarkable, but did not visit a podiatrist. Pt endorses nocturia 5x.  24 POCT A1c 6.2%  2024 Pt presents today with her daughter [place] who translated for the pt. She reports her mother had BS readings of 130 today. She sees a Pulmonologist for hx of lung fibrosis. She is currently on steroids and her Pulmonologist does not believe she will be tapered down. Pt's daughter notes that she took Trulicity 0.75 mg for one week instead of 1.5 mg qw. Now on Trulicity 1.5 mg qw. Denies polyuria and nocturia. Plans to see a RD.   [Medications verified as per pt on 2024] Current Medications: Trulicity 1.5 mg qw ( Pioglitazone 30 mg qd, Prednisone 5 mg BID (Will not be tapared down)  Gabapentin 300 mg qid, Atorvastatin 20 mg qd, Amlodipine 10 mg qd, Sertraline HCl 100 mg qd, Quetiapine 100 mg qd, vitamin D3 2000 IU, Magnesium L-threonate 144 mg, vitamin C HELD: Metformin 1 g bid (held on 2024) Medication modified/added this visit: Hold Metformin, increase Trulicity to 1.5 mg qw  Medication modified/added this visit: continue Trulicity, stop Pioglitazone, if sugars are high, will start prandin 5 mg ac.

## 2024-05-27 NOTE — REASON FOR VISIT
[Follow - Up] : a follow-up visit [DM Type 2] : DM Type 2 [Other: _____] : [unfilled] [FreeTextEntry2] :  Nicholas Concepcion MD, FAX [phone]

## 2024-05-27 NOTE — ADDENDUM
[FreeTextEntry1] : CARLO, Lili Gottlieb, am scribing for an in the presence of  Dr. Cheng Peñaloza on this date in the following sections HISTORY OF PRESENT ILLNESS, PAST MEDICAL/FAMILY/SOCIAL HISTORY; REVIEW OF SYSTEMS; VITAL SIGNS; PHYSICAL EXAM; ASSESSMENT/PLAN.

## 2024-05-28 ENCOUNTER — NON-APPOINTMENT (OUTPATIENT)
Age: 74
End: 2024-05-28

## 2024-05-28 ENCOUNTER — APPOINTMENT (OUTPATIENT)
Dept: NEUROLOGY | Facility: CLINIC | Age: 74
End: 2024-05-28

## 2024-05-28 LAB
ALBUMIN SERPL ELPH-MCNC: 4.8 G/DL
ALP BLD-CCNC: 102 U/L
ALT SERPL-CCNC: 20 U/L
ANION GAP SERPL CALC-SCNC: 23 MMOL/L
AST SERPL-CCNC: 26 U/L
BILIRUB SERPL-MCNC: 0.3 MG/DL
BUN SERPL-MCNC: 23 MG/DL
C PEPTIDE SERPL-MCNC: 5.1 NG/ML
CALCIUM SERPL-MCNC: 9.8 MG/DL
CHLORIDE SERPL-SCNC: 100 MMOL/L
CHOLEST SERPL-MCNC: 190 MG/DL
CO2 SERPL-SCNC: 16 MMOL/L
CREAT SERPL-MCNC: 0.86 MG/DL
CREAT SPEC-SCNC: 110 MG/DL
EGFR: 72 ML/MIN/1.73M2
ESTIMATED AVERAGE GLUCOSE: 154 MG/DL
GLUCOSE SERPL-MCNC: 152 MG/DL
HBA1C MFR BLD HPLC: 7 %
HCT VFR BLD CALC: 44.6 %
HDLC SERPL-MCNC: 77 MG/DL
HGB BLD-MCNC: 13.7 G/DL
LDLC SERPL CALC-MCNC: 88 MG/DL
MCHC RBC-ENTMCNC: 29.1 PG
MCHC RBC-ENTMCNC: 30.7 GM/DL
MCV RBC AUTO: 94.7 FL
MICROALBUMIN 24H UR DL<=1MG/L-MCNC: 6.4 MG/DL
MICROALBUMIN/CREAT 24H UR-RTO: 58 MG/G
NONHDLC SERPL-MCNC: 113 MG/DL
PLATELET # BLD AUTO: 277 K/UL
POTASSIUM SERPL-SCNC: 4.8 MMOL/L
PROT SERPL-MCNC: 7.8 G/DL
RBC # BLD: 4.71 M/UL
RBC # FLD: 16.1 %
SODIUM SERPL-SCNC: 139 MMOL/L
TRIGL SERPL-MCNC: 152 MG/DL
TSH SERPL-ACNC: 2.35 UIU/ML
WBC # FLD AUTO: 9.39 K/UL

## 2024-06-03 ENCOUNTER — APPOINTMENT (OUTPATIENT)
Dept: OBGYN | Facility: CLINIC | Age: 74
End: 2024-06-03

## 2024-06-11 ENCOUNTER — APPOINTMENT (OUTPATIENT)
Dept: ENDOCRINOLOGY | Facility: CLINIC | Age: 74
End: 2024-06-11
Payer: MEDICARE

## 2024-06-11 PROCEDURE — 99214 OFFICE O/P EST MOD 30 MIN: CPT | Mod: GC

## 2024-06-11 NOTE — REASON FOR VISIT
[Follow - Up] : a follow-up visit [DM Type 2] : DM Type 2 [Home] : at home, [unfilled] , at the time of the visit. [Medical Office: ([facility])___] : at the medical office located in  [Family Member] : family member [Verbal consent obtained from patient] : the patient, [unfilled]

## 2024-06-14 ENCOUNTER — OUTPATIENT (OUTPATIENT)
Dept: OUTPATIENT SERVICES | Facility: HOSPITAL | Age: 74
LOS: 1 days | End: 2024-06-14
Payer: MEDICARE

## 2024-06-14 ENCOUNTER — APPOINTMENT (OUTPATIENT)
Dept: MAMMOGRAPHY | Facility: IMAGING CENTER | Age: 74
End: 2024-06-14
Payer: MEDICARE

## 2024-06-14 ENCOUNTER — APPOINTMENT (OUTPATIENT)
Dept: RADIOLOGY | Facility: IMAGING CENTER | Age: 74
End: 2024-06-14
Payer: MEDICARE

## 2024-06-14 DIAGNOSIS — R92.2 INCONCLUSIVE MAMMOGRAM: ICD-10-CM

## 2024-06-14 DIAGNOSIS — Z98.891 HISTORY OF UTERINE SCAR FROM PREVIOUS SURGERY: Chronic | ICD-10-CM

## 2024-06-14 DIAGNOSIS — E11.8 TYPE 2 DIABETES MELLITUS WITH UNSPECIFIED COMPLICATIONS: ICD-10-CM

## 2024-06-14 DIAGNOSIS — Z12.31 ENCOUNTER FOR SCREENING MAMMOGRAM FOR MALIGNANT NEOPLASM OF BREAST: ICD-10-CM

## 2024-06-14 DIAGNOSIS — Z98.890 OTHER SPECIFIED POSTPROCEDURAL STATES: Chronic | ICD-10-CM

## 2024-06-14 PROCEDURE — 77063 BREAST TOMOSYNTHESIS BI: CPT | Mod: 26

## 2024-06-14 PROCEDURE — 77067 SCR MAMMO BI INCL CAD: CPT | Mod: 26

## 2024-06-14 PROCEDURE — 77080 DXA BONE DENSITY AXIAL: CPT

## 2024-06-14 PROCEDURE — 77080 DXA BONE DENSITY AXIAL: CPT | Mod: 26

## 2024-06-14 PROCEDURE — 77063 BREAST TOMOSYNTHESIS BI: CPT

## 2024-06-14 PROCEDURE — 77067 SCR MAMMO BI INCL CAD: CPT

## 2024-06-19 NOTE — DATA REVIEWED
[FreeTextEntry1] : 5/8/24: A1c 7.0% serum creatinine: 0.86 mg/dL, BUN 23 mg/dL, eGFR 72 mL/min AST 26 U/L, ALT 20 U/L, Alk Phos 102 U/L c-peptide 5.1 ng/mL LDL-c 88 mg/dL, HDL-c 77 mg/dL, Total-c 190mg/dL, Triglycerides 152 mg/dL ACR 58 mg/g TSH 2.35 uIU/mL

## 2024-06-19 NOTE — HISTORY OF PRESENT ILLNESS
[FreeTextEntry1] : Arelis Corcoran is a 74 year old female who presents for follow up on diabetes management. She and her daughter, Brooklyn, were present during the telephone visit.  DM diagnosed before  Complications: Unknown   Patient of Dr. Peñaloza, last seen May 24, 2024   PMHx: HTN, HLD, Minor lung fibrosis (2 years ago, Prednisolone 4 mg), spinal stenosis, arthritis PSHx:  Section, Gallbladder Surgery FMHx: HTN, arthritis Allergies: Olmesartan, Medoxomil    Ms. Corcoran has been taking Prednisone 10mg daily for history of lung fibrosis (not to be tapered down, per Pulmonologist). On establishing care with Dr. Peñaloza in early May 2024, she reported a history of elevated blood glucose although it was currently well controlled. At this time pt recommended to continue Trulicity/Actos and stop Metformin. However, on , post meal glucose readings were 253mg/dL, and 450mg/dL. Dr. Peñaloza recommended to restart Metformin, continue Trulicity and Actos, and modify diet.  Reports she has been feeling better since starting Metformin and her blood glucose is more controlled.  She has started walking a bit as well. She has noticed she gets lightheaded when blood glucose is 90mg/dL.   Current medication regimen: Trulicity 1.5mg weekly Actos 30mg daily  Metformin 1000mg BID   Arelis measures blood glucose via glucometer/fingerstick: FBS:  Bedtime BS:    Hypoglycemia: Reports lowest BG has been 90mg/dL -- at this point she feels a bit dizzy, "not 100%", and feeling resolves when her blood glucose goes up a bit.   Has made changes to diet since hyperglycemia on . Daughter notes she started very strict regimen, not eating carbs first few days after hyperglycemia, but now she eats a little carbs.  She eats breakfast in the AM, then takes meds. Breakfast is piece of toast w/butter. Lunch is biggest meal, rice, protein, vegetable In between meals she snacks on fruit - apples, oranges Dinner: tea, cookies/crackers  Prior to BG 450mg/dL on , pt had uncontrolled diet, ate more pasta, soup w/carbs, dessert w/lots of sugar (per daughter). She has also started walking a bit more (reported cramping in legs when walking)  Vaccinated for flu, covid, and pneumonia.  Social history:  No EtOH, no smoking  Lives with   Current Medications: Trulicity 1.5 mg qw, Prednisone 10 mg daily, Gabapentin 300 mg qd, Atorvastatin 20 mg qd, Amlodipine 10 mg qd, Sertraline HCl 100 mg qd, Quetiapine 100 mg qd, vitamin D3 2000 IU, Magnesium L-threonate 144 mg, vitamin C

## 2024-06-25 ENCOUNTER — APPOINTMENT (OUTPATIENT)
Dept: ENDOCRINOLOGY | Facility: CLINIC | Age: 74
End: 2024-06-25

## 2024-06-25 DIAGNOSIS — E11.8 TYPE 2 DIABETES MELLITUS WITH UNSPECIFIED COMPLICATIONS: ICD-10-CM

## 2024-06-25 PROCEDURE — 99443: CPT | Mod: 93

## 2024-07-15 ENCOUNTER — APPOINTMENT (OUTPATIENT)
Dept: ENDOCRINOLOGY | Facility: CLINIC | Age: 74
End: 2024-07-15

## 2024-07-19 ENCOUNTER — APPOINTMENT (OUTPATIENT)
Dept: ENDOCRINOLOGY | Facility: CLINIC | Age: 74
End: 2024-07-19

## 2024-08-21 ENCOUNTER — NON-APPOINTMENT (OUTPATIENT)
Age: 74
End: 2024-08-21

## 2024-08-21 ENCOUNTER — APPOINTMENT (OUTPATIENT)
Dept: CARDIOLOGY | Facility: CLINIC | Age: 74
End: 2024-08-21
Payer: MEDICARE

## 2024-08-21 VITALS
HEIGHT: 62 IN | BODY MASS INDEX: 24.84 KG/M2 | HEART RATE: 98 BPM | DIASTOLIC BLOOD PRESSURE: 74 MMHG | WEIGHT: 135 LBS | TEMPERATURE: 98.3 F | SYSTOLIC BLOOD PRESSURE: 128 MMHG | OXYGEN SATURATION: 98 %

## 2024-08-21 DIAGNOSIS — I10 ESSENTIAL (PRIMARY) HYPERTENSION: ICD-10-CM

## 2024-08-21 DIAGNOSIS — E78.5 HYPERLIPIDEMIA, UNSPECIFIED: ICD-10-CM

## 2024-08-21 DIAGNOSIS — R06.02 SHORTNESS OF BREATH: ICD-10-CM

## 2024-08-21 PROCEDURE — 99204 OFFICE O/P NEW MOD 45 MIN: CPT

## 2024-08-21 PROCEDURE — 93000 ELECTROCARDIOGRAM COMPLETE: CPT

## 2024-08-21 PROCEDURE — 93306 TTE W/DOPPLER COMPLETE: CPT

## 2024-08-21 RX ORDER — MYCOPHENOLATE MOFETIL 250 MG/1
250 CAPSULE ORAL AT BEDTIME
Refills: 0 | Status: ACTIVE | COMMUNITY

## 2024-08-21 RX ORDER — DONEPEZIL HYDROCHLORIDE 5 MG/1
5 TABLET ORAL
Refills: 0 | Status: ACTIVE | COMMUNITY

## 2024-08-21 RX ORDER — QUETIAPINE 100 MG/1
100 TABLET, FILM COATED ORAL
Refills: 0 | Status: ACTIVE | COMMUNITY

## 2024-08-21 RX ORDER — PIOGLITAZONE HYDROCHLORIDE 30 MG/1
30 TABLET ORAL DAILY
Refills: 0 | Status: ACTIVE | COMMUNITY

## 2024-08-21 NOTE — HISTORY OF PRESENT ILLNESS
[FreeTextEntry1] : Arelis De La Cruz is a 74- year- old female with history of Hypertension, hypercholesterolemia, diabetes interstitial pulmonary fibrosis and Anxiety comes for follow up visit. Denies any chest pain or palpitations. Mild chronic shortness of breath on exertion. Complaining of chronic dry cough. Compliant to medications. Physically not active. Compliant to medications and diet. Medications reviewed and updated.

## 2024-08-21 NOTE — DISCUSSION/SUMMARY
[FreeTextEntry1] : In a summary Arelis De La Cruz is an -elderly -female with Hypertension, controlled. Continue Amlodipine and 2 gm sodium diet.  Hypercholesterolemia, continue Atorvastatin and low cholesterol diet. LDL goal less than 70 g/dL. Diabetes, on medications and low Carb diet. Shortness of breath and risk factors, Echo done showed normal LV systolic function. Echo results explained. Pharmacological nuclear stress test to rule out ischemia. Further plan based on stress test results. Follow up in 6 months. Spoke with daughter.

## 2024-09-05 ENCOUNTER — APPOINTMENT (OUTPATIENT)
Dept: CV DIAGNOSTICS | Facility: HOSPITAL | Age: 74
End: 2024-09-05

## 2024-09-05 ENCOUNTER — RESULT REVIEW (OUTPATIENT)
Age: 74
End: 2024-09-05

## 2024-09-05 ENCOUNTER — OUTPATIENT (OUTPATIENT)
Dept: OUTPATIENT SERVICES | Facility: HOSPITAL | Age: 74
LOS: 1 days | End: 2024-09-05
Payer: MEDICARE

## 2024-09-05 DIAGNOSIS — Z90.49 ACQUIRED ABSENCE OF OTHER SPECIFIED PARTS OF DIGESTIVE TRACT: Chronic | ICD-10-CM

## 2024-09-05 DIAGNOSIS — Z98.890 OTHER SPECIFIED POSTPROCEDURAL STATES: Chronic | ICD-10-CM

## 2024-09-05 DIAGNOSIS — E11.9 TYPE 2 DIABETES MELLITUS WITHOUT COMPLICATIONS: ICD-10-CM

## 2024-09-05 DIAGNOSIS — Z98.891 HISTORY OF UTERINE SCAR FROM PREVIOUS SURGERY: Chronic | ICD-10-CM

## 2024-09-05 PROCEDURE — 93018 CV STRESS TEST I&R ONLY: CPT | Mod: GC,MC

## 2024-09-05 PROCEDURE — 93010 ELECTROCARDIOGRAM REPORT: CPT

## 2024-09-05 PROCEDURE — 93016 CV STRESS TEST SUPVJ ONLY: CPT | Mod: GC,MC

## 2024-09-05 PROCEDURE — 78451 HT MUSCLE IMAGE SPECT SING: CPT | Mod: 26,MC

## 2025-02-18 ENCOUNTER — APPOINTMENT (OUTPATIENT)
Dept: NEUROLOGY | Facility: CLINIC | Age: 75
End: 2025-02-18

## 2025-02-21 ENCOUNTER — APPOINTMENT (OUTPATIENT)
Dept: CARDIOLOGY | Facility: CLINIC | Age: 75
End: 2025-02-21

## 2025-05-30 ENCOUNTER — NON-APPOINTMENT (OUTPATIENT)
Age: 75
End: 2025-05-30

## 2025-05-30 ENCOUNTER — APPOINTMENT (OUTPATIENT)
Dept: CARDIOLOGY | Facility: CLINIC | Age: 75
End: 2025-05-30
Payer: MEDICARE

## 2025-05-30 VITALS
DIASTOLIC BLOOD PRESSURE: 77 MMHG | RESPIRATION RATE: 16 BRPM | SYSTOLIC BLOOD PRESSURE: 132 MMHG | HEART RATE: 90 BPM | WEIGHT: 133 LBS | TEMPERATURE: 98.7 F | OXYGEN SATURATION: 87 % | HEIGHT: 62 IN | BODY MASS INDEX: 24.48 KG/M2

## 2025-05-30 DIAGNOSIS — E78.5 HYPERLIPIDEMIA, UNSPECIFIED: ICD-10-CM

## 2025-05-30 DIAGNOSIS — I10 ESSENTIAL (PRIMARY) HYPERTENSION: ICD-10-CM

## 2025-05-30 PROCEDURE — 99214 OFFICE O/P EST MOD 30 MIN: CPT

## 2025-05-30 PROCEDURE — 93000 ELECTROCARDIOGRAM COMPLETE: CPT

## (undated) DEVICE — VENODYNE/SCD SLEEVE CALF LARGE

## (undated) DEVICE — DRSG 4 X 4" 6PLY

## (undated) DEVICE — PREP DURAPREP 26CC

## (undated) DEVICE — DRAPE C ARM UNIVERSAL

## (undated) DEVICE — SOLIDIFIER CANN EXPRESS 3K

## (undated) DEVICE — GLV 8.5 PROTEXIS (WHITE)

## (undated) DEVICE — SUT POLYSORB 2-0 30" GS-11 UNDYED

## (undated) DEVICE — DRSG STERISTRIPS 0.5 X 4"

## (undated) DEVICE — DRAPE TOWEL BLUE 17" X 24"

## (undated) DEVICE — GLV 8 PROTEXIS (WHITE)

## (undated) DEVICE — POSITIONER STRAP ARMBOARD VELCRO TS-30

## (undated) DEVICE — SUT MONOSOF 2-0 18" C-15

## (undated) DEVICE — PREP DURAPREP 6CC

## (undated) DEVICE — CANISTER SUCTION LID GUARD 3000CC

## (undated) DEVICE — DRSG CURITY GAUZE SPONGE 4 X 4" 12-PLY

## (undated) DEVICE — ELCTR STRYKER NEPTUNE SMOKE EVACUATION PENCIL (GREEN)

## (undated) DEVICE — DRAPE MICROSCOPE LEICA 26" X 150"

## (undated) DEVICE — DRAPE PEDIATRIC DIRECTIONAL INCISION

## (undated) DEVICE — SOL IRR POUR NS 0.9% 500ML

## (undated) DEVICE — SUT MONOCRYL 3-0 27" PS-2 UNDYED

## (undated) DEVICE — POSITIONER JACKSON TABLE HEADREST 7", CHEST, HIP, THIGH PADS

## (undated) DEVICE — NDL SPINAL 18G X 3.5" (PINK)

## (undated) DEVICE — GLV 7.5 PROTEXIS (WHITE)

## (undated) DEVICE — DRAPE COVER TABLE 44X75"

## (undated) DEVICE — POSITIONER FOAM HEAD CRADLE (PINK)

## (undated) DEVICE — WARMING BLANKET UPPER ADULT

## (undated) DEVICE — ELCTR BOVIE TIP BLADE INSULATED 2.75" EDGE

## (undated) DEVICE — VISITEC 4X4

## (undated) DEVICE — TUBING IV EXTENSION PUMP MICROBORE LUER LOCK 36"

## (undated) DEVICE — SPECIMEN CONTAINER 100ML

## (undated) DEVICE — PACK SPINE

## (undated) DEVICE — SUT POLYSORB 1 30" GS-10 UNDYED

## (undated) DEVICE — SUT POLYSORB 1 27" GS-21 UNDYED

## (undated) DEVICE — DRAPE MAYO STAND 30"

## (undated) DEVICE — DRAPE 3/4 SHEET W REINFORCEMENT 56X77"

## (undated) DEVICE — BUR CONMED ULTRAPOWER ROUND CYLINDER 3MM

## (undated) DEVICE — ELCTR BOVIE TIP BLADE INSULATED 4" EDGE

## (undated) DEVICE — DRAPE 1/2 SHEET 40X57"

## (undated) DEVICE — GLV 7.5 ULTRAFREE MAX

## (undated) DEVICE — VENODYNE/SCD SLEEVE CALF MEDIUM

## (undated) DEVICE — SOL IRR POUR H2O 1500ML

## (undated) DEVICE — NDL SPINAL 22G X 3.5" QUINCKE

## (undated) DEVICE — SOL TOPICAL POVIDONE IODINE PVP-1

## (undated) DEVICE — TRAY EPIDURAL SINGLE DOSE